# Patient Record
Sex: FEMALE | Race: WHITE | NOT HISPANIC OR LATINO | Employment: OTHER | URBAN - METROPOLITAN AREA
[De-identification: names, ages, dates, MRNs, and addresses within clinical notes are randomized per-mention and may not be internally consistent; named-entity substitution may affect disease eponyms.]

---

## 2023-03-24 PROBLEM — R00.0 TACHYCARDIA: Status: ACTIVE | Noted: 2023-03-24

## 2023-03-24 PROBLEM — R79.89 ABNORMAL TSH: Status: ACTIVE | Noted: 2023-03-24

## 2023-03-24 PROBLEM — E78.5 HYPERLIPIDEMIA: Status: ACTIVE | Noted: 2023-03-24

## 2023-03-24 PROBLEM — G62.9 NEUROPATHY: Status: ACTIVE | Noted: 2023-03-24

## 2023-03-24 PROBLEM — M77.8 TENDINITIS OF FINGER OF RIGHT HAND: Status: ACTIVE | Noted: 2023-03-24

## 2023-03-24 PROBLEM — I48.0 PAROXYSMAL ATRIAL FIBRILLATION (MULTI): Status: ACTIVE | Noted: 2023-03-24

## 2023-03-24 PROBLEM — I48.91 ATRIAL FIBRILLATION (MULTI): Status: ACTIVE | Noted: 2023-03-24

## 2023-03-24 PROBLEM — R73.9 BORDERLINE HYPERGLYCEMIA: Status: ACTIVE | Noted: 2023-03-24

## 2023-03-24 PROBLEM — I25.10 CORONARY ARTERY CALCIFICATION: Status: ACTIVE | Noted: 2023-03-24

## 2023-03-24 PROBLEM — R93.89 ABNORMAL CXR (CHEST X-RAY): Status: ACTIVE | Noted: 2023-03-24

## 2023-03-24 PROBLEM — E66.3 OVERWEIGHT (BMI 25.0-29.9): Status: ACTIVE | Noted: 2023-03-24

## 2023-03-24 PROBLEM — B96.89 ACUTE BACTERIAL SINUSITIS: Status: ACTIVE | Noted: 2023-03-24

## 2023-03-24 PROBLEM — J01.90 ACUTE BACTERIAL SINUSITIS: Status: ACTIVE | Noted: 2023-03-24

## 2023-03-24 PROBLEM — R91.1 LUNG NODULE: Status: ACTIVE | Noted: 2023-03-24

## 2023-03-24 PROBLEM — G47.33 OBSTRUCTIVE SLEEP APNEA: Status: ACTIVE | Noted: 2023-03-24

## 2023-03-24 PROBLEM — I10 HYPERTENSION, BENIGN: Status: ACTIVE | Noted: 2023-03-24

## 2023-03-24 PROBLEM — G43.909 MIGRAINE: Status: ACTIVE | Noted: 2023-03-24

## 2023-03-24 PROBLEM — E78.00 PURE HYPERCHOLESTEROLEMIA: Status: ACTIVE | Noted: 2023-03-24

## 2023-03-24 PROBLEM — K64.9 HEMORRHOIDS: Status: ACTIVE | Noted: 2023-03-24

## 2023-03-24 PROBLEM — I25.84 CORONARY ARTERY CALCIFICATION: Status: ACTIVE | Noted: 2023-03-24

## 2023-03-24 RX ORDER — METOPROLOL SUCCINATE 50 MG/1
1 TABLET, EXTENDED RELEASE ORAL DAILY
COMMUNITY
Start: 2014-12-10 | End: 2023-04-10 | Stop reason: SDUPTHER

## 2023-03-24 RX ORDER — ZINC SULFATE 50(220)MG
1 CAPSULE ORAL DAILY
COMMUNITY

## 2023-03-24 RX ORDER — WARFARIN 4 MG/1
4 TABLET ORAL 2 TIMES WEEKLY
COMMUNITY
Start: 2016-06-27

## 2023-03-24 RX ORDER — POTASSIUM CHLORIDE 20 MEQ/1
1 TABLET, EXTENDED RELEASE ORAL EVERY 12 HOURS
COMMUNITY
Start: 2016-02-14 | End: 2024-03-05 | Stop reason: SDUPTHER

## 2023-03-24 RX ORDER — TALC
250 POWDER (GRAM) TOPICAL 2 TIMES DAILY
COMMUNITY
End: 2023-11-09 | Stop reason: ENTERED-IN-ERROR

## 2023-03-24 RX ORDER — PNV NO.95/FERROUS FUM/FOLIC AC 28MG-0.8MG
1 TABLET ORAL DAILY
COMMUNITY

## 2023-03-24 RX ORDER — SIMVASTATIN 10 MG/1
1 TABLET, FILM COATED ORAL DAILY
COMMUNITY
Start: 2021-04-17 | End: 2023-04-10 | Stop reason: SDUPTHER

## 2023-03-24 RX ORDER — LISINOPRIL AND HYDROCHLOROTHIAZIDE 12.5; 2 MG/1; MG/1
1 TABLET ORAL DAILY
COMMUNITY
Start: 2015-07-28 | End: 2023-10-30

## 2023-03-24 RX ORDER — FLECAINIDE ACETATE 100 MG/1
100 TABLET ORAL 2 TIMES DAILY
COMMUNITY
Start: 2016-02-14 | End: 2023-11-30 | Stop reason: WASHOUT

## 2023-03-24 RX ORDER — IBUPROFEN 100 MG/5ML
1 SUSPENSION, ORAL (FINAL DOSE FORM) ORAL DAILY
COMMUNITY
End: 2023-11-09 | Stop reason: ENTERED-IN-ERROR

## 2023-03-24 RX ORDER — MULTIVITAMIN
1 TABLET ORAL DAILY
COMMUNITY

## 2023-03-24 RX ORDER — ACETAMINOPHEN 500 MG
1 TABLET ORAL DAILY
COMMUNITY
End: 2023-11-09 | Stop reason: ENTERED-IN-ERROR

## 2023-03-24 RX ORDER — LISINOPRIL 20 MG/1
20 TABLET ORAL DAILY
COMMUNITY
Start: 2019-04-16 | End: 2023-12-12 | Stop reason: SDUPTHER

## 2023-03-24 RX ORDER — GABAPENTIN 300 MG/1
300 CAPSULE ORAL 2 TIMES DAILY
COMMUNITY
Start: 2014-11-07 | End: 2023-07-05 | Stop reason: SDUPTHER

## 2023-04-04 DIAGNOSIS — E78.00 PURE HYPERCHOLESTEROLEMIA: ICD-10-CM

## 2023-04-04 DIAGNOSIS — I10 HYPERTENSION, BENIGN: ICD-10-CM

## 2023-04-04 RX ORDER — CLINDAMYCIN HYDROCHLORIDE 300 MG/1
CAPSULE ORAL
COMMUNITY
Start: 2023-02-23 | End: 2023-11-09 | Stop reason: ENTERED-IN-ERROR

## 2023-04-10 ENCOUNTER — TELEPHONE (OUTPATIENT)
Dept: PRIMARY CARE | Facility: CLINIC | Age: 68
End: 2023-04-10
Payer: MEDICARE

## 2023-04-10 DIAGNOSIS — E78.5 HYPERLIPIDEMIA, UNSPECIFIED HYPERLIPIDEMIA TYPE: ICD-10-CM

## 2023-04-10 DIAGNOSIS — I10 HYPERTENSION, BENIGN: Primary | ICD-10-CM

## 2023-04-10 DIAGNOSIS — I48.11 LONGSTANDING PERSISTENT ATRIAL FIBRILLATION (MULTI): ICD-10-CM

## 2023-04-10 RX ORDER — METOPROLOL SUCCINATE 50 MG/1
50 TABLET, EXTENDED RELEASE ORAL DAILY
Qty: 90 TABLET | Refills: 3 | Status: SHIPPED | OUTPATIENT
Start: 2023-04-10 | End: 2023-11-03 | Stop reason: SDUPTHER

## 2023-04-10 RX ORDER — SIMVASTATIN 10 MG/1
10 TABLET, FILM COATED ORAL DAILY
Qty: 90 TABLET | Refills: 3 | Status: SHIPPED | OUTPATIENT
Start: 2023-04-10 | End: 2023-11-28 | Stop reason: WASHOUT

## 2023-04-10 NOTE — TELEPHONE ENCOUNTER
Pt called and states that she has been trying to get her simvastatin and metoprolol refilled and they are pending orders and pt states that she needs a refill. Pharmacy is on file

## 2023-04-17 RX ORDER — METOPROLOL SUCCINATE 50 MG/1
TABLET, EXTENDED RELEASE ORAL
Qty: 90 TABLET | Refills: 0 | Status: SHIPPED | OUTPATIENT
Start: 2023-04-17 | End: 2023-11-03 | Stop reason: SDUPTHER

## 2023-04-17 RX ORDER — SIMVASTATIN 10 MG/1
TABLET, FILM COATED ORAL
Qty: 90 TABLET | Refills: 0 | Status: SHIPPED | OUTPATIENT
Start: 2023-04-17 | End: 2024-01-02

## 2023-07-05 DIAGNOSIS — G62.9 NEUROPATHY: Primary | ICD-10-CM

## 2023-07-05 RX ORDER — GABAPENTIN 300 MG/1
300 CAPSULE ORAL 2 TIMES DAILY
Qty: 180 CAPSULE | Refills: 1 | Status: SHIPPED | OUTPATIENT
Start: 2023-07-05 | End: 2023-12-26 | Stop reason: SDUPTHER

## 2023-10-12 ENCOUNTER — ANTICOAGULATION - WARFARIN VISIT (OUTPATIENT)
Dept: PHARMACY | Facility: CLINIC | Age: 68
End: 2023-10-12
Payer: MEDICARE

## 2023-10-12 DIAGNOSIS — I48.91 ATRIAL FIBRILLATION, UNSPECIFIED TYPE (MULTI): Primary | ICD-10-CM

## 2023-10-12 LAB
POC INR: 3.1
POC PROTHROMBIN TIME: NORMAL

## 2023-10-12 PROCEDURE — 99212 OFFICE O/P EST SF 10 MIN: CPT | Performed by: PHARMACIST

## 2023-10-12 PROCEDURE — 85610 PROTHROMBIN TIME: CPT | Performed by: PHARMACIST

## 2023-10-12 RX ORDER — DIFLUPREDNATE OPHTHALMIC 0.5 MG/ML
EMULSION OPHTHALMIC
COMMUNITY
Start: 2023-07-19 | End: 2023-11-09 | Stop reason: ENTERED-IN-ERROR

## 2023-10-12 RX ORDER — BROMFENAC SODIUM 0.7 MG/ML
SOLUTION/ DROPS OPHTHALMIC
COMMUNITY
Start: 2023-07-19 | End: 2023-11-09 | Stop reason: ENTERED-IN-ERROR

## 2023-10-12 RX ORDER — L.ACID,FERM,PLA,RHA/B.BIF,LONG 126 MG
1 TABLET, DELAYED AND EXTENDED RELEASE ORAL DAILY
COMMUNITY
Start: 2022-12-13 | End: 2023-11-09 | Stop reason: ENTERED-IN-ERROR

## 2023-10-12 RX ORDER — OFLOXACIN 3 MG/ML
SOLUTION/ DROPS OPHTHALMIC
COMMUNITY
Start: 2023-07-19 | End: 2023-11-09 | Stop reason: ENTERED-IN-ERROR

## 2023-10-12 RX ORDER — ASCORBIC ACID 500 MG
1 TABLET ORAL DAILY
COMMUNITY

## 2023-10-12 RX ORDER — ACETAMINOPHEN 500 MG
5 TABLET ORAL NIGHTLY
COMMUNITY
Start: 2022-12-13

## 2023-10-12 NOTE — PROGRESS NOTES
Coumadin Clinic Visit Note    Patient verified warfarin dose  No missed doses  No unusual bruising or bleeding  No changes to medications  Consistent dietary green intake  No anticipated procedures at this time  INR Supratherapeutic today at 3.1  No changes to warfarin dose today  Next appointment 4 weeks      Yamilex Vickers, PharmD

## 2023-10-28 DIAGNOSIS — I10 HYPERTENSION, BENIGN: ICD-10-CM

## 2023-10-28 DIAGNOSIS — R73.9 BORDERLINE HYPERGLYCEMIA: Primary | ICD-10-CM

## 2023-10-30 RX ORDER — ATENOLOL AND CHLORTHALIDONE TABLET 50; 25 MG/1; MG/1
1 TABLET ORAL DAILY
COMMUNITY
Start: 2004-10-28 | End: 2023-11-09 | Stop reason: ENTERED-IN-ERROR

## 2023-10-30 RX ORDER — OXYCODONE AND ACETAMINOPHEN 5; 325 MG/1; MG/1
TABLET ORAL
COMMUNITY
Start: 2006-12-22 | End: 2023-11-09 | Stop reason: ENTERED-IN-ERROR

## 2023-10-30 RX ORDER — MAGNESIUM 250 MG
TABLET ORAL
COMMUNITY
End: 2023-11-09 | Stop reason: ENTERED-IN-ERROR

## 2023-10-30 RX ORDER — LISINOPRIL AND HYDROCHLOROTHIAZIDE 12.5; 2 MG/1; MG/1
1 TABLET ORAL DAILY
Qty: 90 TABLET | Refills: 0 | Status: SHIPPED | OUTPATIENT
Start: 2023-10-30 | End: 2024-02-02 | Stop reason: SDUPTHER

## 2023-11-03 DIAGNOSIS — I10 HYPERTENSION, BENIGN: ICD-10-CM

## 2023-11-05 RX ORDER — METOPROLOL SUCCINATE 50 MG/1
50 TABLET, EXTENDED RELEASE ORAL DAILY
Qty: 90 TABLET | Refills: 0 | Status: SHIPPED | OUTPATIENT
Start: 2023-11-05 | End: 2024-04-02 | Stop reason: SDUPTHER

## 2023-11-05 RX ORDER — METOPROLOL SUCCINATE 50 MG/1
50 TABLET, EXTENDED RELEASE ORAL DAILY
Qty: 90 TABLET | Refills: 3 | Status: SHIPPED | OUTPATIENT
Start: 2023-11-05 | End: 2023-11-28 | Stop reason: WASHOUT

## 2023-11-09 ENCOUNTER — HOSPITAL ENCOUNTER (OUTPATIENT)
Dept: CARDIOLOGY | Facility: HOSPITAL | Age: 68
Discharge: HOME | End: 2023-11-09
Payer: MEDICARE

## 2023-11-09 ENCOUNTER — ANTICOAGULATION - WARFARIN VISIT (OUTPATIENT)
Dept: PHARMACY | Facility: CLINIC | Age: 68
End: 2023-11-09
Payer: MEDICARE

## 2023-11-09 ENCOUNTER — TELEPHONE (OUTPATIENT)
Dept: CARDIOLOGY | Facility: CLINIC | Age: 68
End: 2023-11-09
Payer: MEDICARE

## 2023-11-09 ENCOUNTER — APPOINTMENT (OUTPATIENT)
Dept: RADIOLOGY | Facility: HOSPITAL | Age: 68
End: 2023-11-09
Payer: MEDICARE

## 2023-11-09 ENCOUNTER — HOSPITAL ENCOUNTER (EMERGENCY)
Facility: HOSPITAL | Age: 68
Discharge: HOME | End: 2023-11-09
Attending: STUDENT IN AN ORGANIZED HEALTH CARE EDUCATION/TRAINING PROGRAM
Payer: MEDICARE

## 2023-11-09 VITALS
RESPIRATION RATE: 16 BRPM | BODY MASS INDEX: 28.61 KG/M2 | HEART RATE: 79 BPM | WEIGHT: 178 LBS | TEMPERATURE: 97 F | OXYGEN SATURATION: 97 % | DIASTOLIC BLOOD PRESSURE: 73 MMHG | HEIGHT: 66 IN | SYSTOLIC BLOOD PRESSURE: 144 MMHG

## 2023-11-09 DIAGNOSIS — I48.0 PAROXYSMAL ATRIAL FIBRILLATION (MULTI): Primary | ICD-10-CM

## 2023-11-09 DIAGNOSIS — I24.9 ACS (ACUTE CORONARY SYNDROME) (MULTI): ICD-10-CM

## 2023-11-09 DIAGNOSIS — I48.91 ATRIAL FIBRILLATION, UNSPECIFIED TYPE (MULTI): Primary | ICD-10-CM

## 2023-11-09 LAB
ALBUMIN SERPL BCP-MCNC: 4.7 G/DL (ref 3.4–5)
ALP SERPL-CCNC: 90 U/L (ref 33–136)
ALT SERPL W P-5'-P-CCNC: 40 U/L (ref 7–45)
ANION GAP SERPL CALC-SCNC: 13 MMOL/L (ref 10–20)
AST SERPL W P-5'-P-CCNC: 37 U/L (ref 9–39)
BASOPHILS # BLD MANUAL: 0.15 X10*3/UL (ref 0–0.1)
BASOPHILS NFR BLD MANUAL: 1 %
BILIRUB SERPL-MCNC: 0.5 MG/DL (ref 0–1.2)
BUN SERPL-MCNC: 15 MG/DL (ref 6–23)
CALCIUM SERPL-MCNC: 9.9 MG/DL (ref 8.6–10.3)
CARDIAC TROPONIN I PNL SERPL HS: 4 NG/L (ref 0–13)
CARDIAC TROPONIN I PNL SERPL HS: 4 NG/L (ref 0–13)
CHLORIDE SERPL-SCNC: 103 MMOL/L (ref 98–107)
CO2 SERPL-SCNC: 28 MMOL/L (ref 21–32)
CREAT SERPL-MCNC: 0.78 MG/DL (ref 0.5–1.05)
EOSINOPHIL # BLD MANUAL: 0.58 X10*3/UL (ref 0–0.7)
EOSINOPHIL NFR BLD MANUAL: 4 %
ERYTHROCYTE [DISTWIDTH] IN BLOOD BY AUTOMATED COUNT: 14.2 % (ref 11.5–14.5)
GFR SERPL CREATININE-BSD FRML MDRD: 82 ML/MIN/1.73M*2
GLUCOSE SERPL-MCNC: 113 MG/DL (ref 74–99)
HCT VFR BLD AUTO: 51.1 % (ref 36–46)
HGB BLD-MCNC: 17 G/DL (ref 12–16)
IMM GRANULOCYTES # BLD AUTO: 0.09 X10*3/UL (ref 0–0.7)
IMM GRANULOCYTES NFR BLD AUTO: 0.6 % (ref 0–0.9)
LYMPHOCYTES # BLD MANUAL: 3.36 X10*3/UL (ref 1.2–4.8)
LYMPHOCYTES NFR BLD MANUAL: 23 %
MAGNESIUM SERPL-MCNC: 1.69 MG/DL (ref 1.6–2.4)
MCH RBC QN AUTO: 30.1 PG (ref 26–34)
MCHC RBC AUTO-ENTMCNC: 33.3 G/DL (ref 32–36)
MCV RBC AUTO: 91 FL (ref 80–100)
MONOCYTES # BLD MANUAL: 1.02 X10*3/UL (ref 0.1–1)
MONOCYTES NFR BLD MANUAL: 7 %
NEUTS SEG # BLD MANUAL: 9.49 X10*3/UL (ref 1.2–7)
NEUTS SEG NFR BLD MANUAL: 65 %
NRBC BLD-RTO: 0 /100 WBCS (ref 0–0)
PLATELET # BLD AUTO: 342 X10*3/UL (ref 150–450)
POC INR: 2.4
POC PROTHROMBIN TIME: NORMAL
POTASSIUM SERPL-SCNC: 4.4 MMOL/L (ref 3.5–5.3)
PROT SERPL-MCNC: 7.7 G/DL (ref 6.4–8.2)
RBC # BLD AUTO: 5.64 X10*6/UL (ref 4–5.2)
RBC MORPH BLD: ABNORMAL
SODIUM SERPL-SCNC: 140 MMOL/L (ref 136–145)
TOTAL CELLS COUNTED BLD: 100
WBC # BLD AUTO: 14.6 X10*3/UL (ref 4.4–11.3)

## 2023-11-09 PROCEDURE — 99283 EMERGENCY DEPT VISIT LOW MDM: CPT | Mod: 25

## 2023-11-09 PROCEDURE — 71045 X-RAY EXAM CHEST 1 VIEW: CPT | Performed by: RADIOLOGY

## 2023-11-09 PROCEDURE — 99285 EMERGENCY DEPT VISIT HI MDM: CPT | Mod: 25 | Performed by: STUDENT IN AN ORGANIZED HEALTH CARE EDUCATION/TRAINING PROGRAM

## 2023-11-09 PROCEDURE — 93005 ELECTROCARDIOGRAM TRACING: CPT

## 2023-11-09 PROCEDURE — 84484 ASSAY OF TROPONIN QUANT: CPT | Performed by: PHYSICIAN ASSISTANT

## 2023-11-09 PROCEDURE — 85007 BL SMEAR W/DIFF WBC COUNT: CPT | Performed by: PHYSICIAN ASSISTANT

## 2023-11-09 PROCEDURE — 84484 ASSAY OF TROPONIN QUANT: CPT | Performed by: STUDENT IN AN ORGANIZED HEALTH CARE EDUCATION/TRAINING PROGRAM

## 2023-11-09 PROCEDURE — 84075 ASSAY ALKALINE PHOSPHATASE: CPT | Performed by: PHYSICIAN ASSISTANT

## 2023-11-09 PROCEDURE — 99284 EMERGENCY DEPT VISIT MOD MDM: CPT | Mod: 25

## 2023-11-09 PROCEDURE — 71045 X-RAY EXAM CHEST 1 VIEW: CPT

## 2023-11-09 PROCEDURE — 85610 PROTHROMBIN TIME: CPT | Mod: QW | Performed by: PHARMACIST

## 2023-11-09 PROCEDURE — 99212 OFFICE O/P EST SF 10 MIN: CPT | Mod: 25 | Performed by: PHARMACIST

## 2023-11-09 PROCEDURE — 85027 COMPLETE CBC AUTOMATED: CPT | Performed by: PHYSICIAN ASSISTANT

## 2023-11-09 PROCEDURE — 36415 COLL VENOUS BLD VENIPUNCTURE: CPT | Performed by: PHYSICIAN ASSISTANT

## 2023-11-09 PROCEDURE — 83735 ASSAY OF MAGNESIUM: CPT | Performed by: PHYSICIAN ASSISTANT

## 2023-11-09 RX ORDER — WARFARIN 4 MG/1
2 TABLET ORAL
COMMUNITY

## 2023-11-09 RX ORDER — CEPHRADINE 500 MG
1 CAPSULE ORAL DAILY
COMMUNITY

## 2023-11-09 RX ORDER — FLECAINIDE ACETATE 50 MG/1
50 TABLET ORAL 2 TIMES DAILY
Qty: 60 TABLET | Refills: 0 | Status: SHIPPED | OUTPATIENT
Start: 2023-11-09 | End: 2023-11-30 | Stop reason: WASHOUT

## 2023-11-09 RX ORDER — BACLOFEN 20 MG
1 TABLET ORAL DAILY
COMMUNITY

## 2023-11-09 ASSESSMENT — PAIN DESCRIPTION - PROGRESSION: CLINICAL_PROGRESSION: NOT CHANGED

## 2023-11-09 ASSESSMENT — LIFESTYLE VARIABLES
REASON UNABLE TO ASSESS: NO
EVER FELT BAD OR GUILTY ABOUT YOUR DRINKING: NO
HAVE PEOPLE ANNOYED YOU BY CRITICIZING YOUR DRINKING: NO
EVER HAD A DRINK FIRST THING IN THE MORNING TO STEADY YOUR NERVES TO GET RID OF A HANGOVER: NO
HAVE YOU EVER FELT YOU SHOULD CUT DOWN ON YOUR DRINKING: NO

## 2023-11-09 ASSESSMENT — COLUMBIA-SUICIDE SEVERITY RATING SCALE - C-SSRS
1. IN THE PAST MONTH, HAVE YOU WISHED YOU WERE DEAD OR WISHED YOU COULD GO TO SLEEP AND NOT WAKE UP?: NO
6. HAVE YOU EVER DONE ANYTHING, STARTED TO DO ANYTHING, OR PREPARED TO DO ANYTHING TO END YOUR LIFE?: NO
2. HAVE YOU ACTUALLY HAD ANY THOUGHTS OF KILLING YOURSELF?: NO

## 2023-11-09 ASSESSMENT — PAIN SCALES - GENERAL
PAINLEVEL_OUTOF10: 0 - NO PAIN

## 2023-11-09 ASSESSMENT — PAIN - FUNCTIONAL ASSESSMENT: PAIN_FUNCTIONAL_ASSESSMENT: 0-10

## 2023-11-09 NOTE — TELEPHONE ENCOUNTER
Patient's  called stating patient has been in Afib since Tuesday. Patient's heart rate sustaining 110-120, /93.  Patient SOB and fatigued. Spoke to patient on phone, who was SOB while speaking to her. Instructed patient to seek ER treatment. Patient verbalized understanding.

## 2023-11-09 NOTE — PROGRESS NOTES
Verified current dose with pt.    No new meds or med changes since last visit.  Pt denies unusual bleed/bruise.  No upcoming procedures.  Inr = 2.4  Will put pt back on old dose of 18 mg/week.    Pt is currently in afib - she was told to go to ED right now.  She is concerned about being admitted; also about inr getting too low - so put back on her old dose.  check again in 4 weeks.

## 2023-11-09 NOTE — PROGRESS NOTES
Pharmacy Medication History Review    Savita Buchanan is a 68 y.o. female admitted for No Principal Problem: There is no principal problem currently on the Problem List. Please update the Problem List and refresh.. Pharmacy reviewed the patient's [id number] medications and allergies for accuracy.    The list below reflectives the updated PTA list. Please review each medication in order reconciliation for additional clarification and justification.  (Not in a hospital admission)       The list below reflectives the updated allergy list. Please review each documented allergy for additional clarification and justification.  Allergies  Reviewed by Fe Ray CPhT on 11/9/2023        Severity Reactions Comments    Penicillins High Hives, Shortness of breath     Latex Medium Hives     Phenobarbital Medium Hives     Sulfa (sulfonamide Antibiotics) Medium Hives     Neomycin-bacitracin-polymyxin Low Rash             Below are additional concerns with the patient's PTA list.    See PTA med list    Fe Ray CPhT

## 2023-11-09 NOTE — ED PROVIDER NOTES
Limitations to History: none  External Records Reviewed  Independent Historians: none  Social determinants affecting care: none    HPI  Savita Buchanan is a 68 y.o. female with history of paroxysmal atrial fibrillation on Coumadin, hypertension, hyperlipidemia, who presents emergency department with her  for assessment of palpitations for the last 3 days.  She reports that she started feeling her heart race and felt short of breath.  She feels a slight tightness in her throat.  She reports that she knows that she is in A-fib.  She is on flecainide has been compliant.  She did take another dose of this but that did not help.  She reports that she called her cardiology's office however the nurse on staff recommended she go to the ER.  She denies any chest pains.  She denies recent illness.  She has not had fever or chills.  She has been compliant with her medication.  She has no further complaints.    [facility]  Past Medical History:   Diagnosis Date    A-fib (CMS/[facility])     Personal history of Methicillin resistant Staphylococcus aureus infection     History of methicillin resistant Staphylococcus aureus infection    Personal history of other diseases of the musculoskeletal system and connective tissue     History of backache    Personal history of other drug therapy 10/12/2016    History of influenza vaccination    Personal history of urinary (tract) infections     History of urinary tract infection    reviewed by myself.    Meds  Current Outpatient Medications   Medication Instructions    ascorbic acid (Vitamin C) 500 mg tablet 1 tablet, oral, Daily    cholecalciferol, vitamin D3, 250 mcg (10,000 unit) capsule 1 capsule, oral, Daily    cyanocobalamin (Vitamin B-12) 100 mcg tablet 1 tablet, oral, Daily    flecainide (TAMBOCOR) 100 mg, oral, 2 times daily    flecainide (TAMBOCOR) 50 mg, oral, 2 times daily    gabapentin (NEURONTIN) 300 mg, oral, 2 times daily    Lactobacillus acidophilus (PROBIOTIC ORAL) 1 tablet,  "oral, Daily    lisinopriL-hydrochlorothiazide 20-12.5 mg tablet 1 tablet, oral, Daily    lisinopril 20 mg, oral, Daily    magnesium oxide 500 mg tablet 1 tablet, oral, Daily    melatonin 5 mg, oral, Nightly    metoprolol succinate XL (TOPROL-XL) 50 mg, oral, Daily, Do not crush or chew.    metoprolol succinate XL (TOPROL-XL) 50 mg, oral, Daily    multivitamin tablet 1 tablet, oral, Daily    potassium chloride CR (Klor-Con M20) 20 mEq ER tablet 1 tablet, oral, Every 12 hours    simvastatin (Zocor) 10 mg tablet TAKE ONE TABLET BY MOUTH EVERY DAY    simvastatin (ZOCOR) 10 mg, oral, Daily    warfarin (COUMADIN) 2 mg, oral, 5 times weekly, On Tuesday, Wednesday, Thursday, Saturday, and Sunday    warfarin (JANTOVEN) 4 mg, oral, 2 times weekly, On Monday and Friday    zinc sulfate (Zincate) 220 (50 Zn) MG capsule 1 capsule, oral, Daily       Allergies  Allergies   Allergen Reactions    Penicillins Hives and Shortness of breath    Latex Hives    Phenobarbital Hives    Sulfa (Sulfonamide Antibiotics) Hives    Neomycin-Bacitracin-Polymyxin Rash    reviewed by myself.    SHx    reviewed by myself.      ------------------------------------------------------------------------------------------------------------------------------------------    /73 (BP Location: Right arm, Patient Position: Lying)   Pulse 79   Temp 36.1 °C (97 °F) (Temporal)   Resp 16   Ht 1.676 m (5' 6\")   Wt 80.7 kg (178 lb)   SpO2 97%   BMI 28.73 kg/m²     Physical Exam  Vitals and nursing note reviewed.   Constitutional:       General: She is not in acute distress.     Appearance: Normal appearance. She is obese. She is not toxic-appearing.   HENT:      Head: Normocephalic.      Nose: Nose normal.      Mouth/Throat:      Mouth: Mucous membranes are moist.   Eyes:      Extraocular Movements: Extraocular movements intact.      Conjunctiva/sclera: Conjunctivae normal.   Cardiovascular:      Rate and Rhythm: Tachycardia present. Rhythm irregular.     "  Pulses: Normal pulses.   Pulmonary:      Effort: Pulmonary effort is normal.      Breath sounds: Normal breath sounds.   Musculoskeletal:         General: Normal range of motion.      Cervical back: Neck supple.      Right lower leg: No edema.      Left lower leg: No edema.   Skin:     General: Skin is warm and dry.   Neurological:      General: No focal deficit present.      Mental Status: She is alert and oriented to person, place, and time.   Psychiatric:         Attention and Perception: Attention normal.         Mood and Affect: Mood normal.          ------------------------------------------------------------------------------------------------------------------------------------------  Imaging  XR chest 1 view   Final Result   No focal infiltrate or pneumothorax is identified.        MACRO:   None.        Signed by: Alex Ramirez 11/9/2023 12:34 PM   Dictation workstation:   [id number]           Labs  Labs Reviewed   CBC WITH AUTO DIFFERENTIAL - Abnormal       Result Value    WBC 14.6 (*)     nRBC 0.0      RBC 5.64 (*)     Hemoglobin 17.0 (*)     Hematocrit 51.1 (*)     MCV 91      MCH 30.1      MCHC 33.3      RDW 14.2      Platelets 342      Immature Granulocytes %, Automated 0.6      Immature Granulocytes Absolute, Automated 0.09      Narrative:     The previously reported component Neutrophils % is no longer being reported.  The previously reported component Lymphocytes % is no longer being reported.  The previously reported component Monocytes % is no longer being reported.  The previously   reported component Eosinophils % is no longer being reported.  The previously reported component Basophils % is no longer being reported.  The previously reported component Absolute Neutrophils is no longer being reported.  The previously reported   component Absolute Lymphocytes is no longer being reported.  The previously reported component Absolute Monocytes is no longer being reported.  The previously reported  component Absolute Eosinophils is no longer being reported.  The previously reported   component Absolute Basophils is no longer being reported.   COMPREHENSIVE METABOLIC PANEL - Abnormal    Glucose 113 (*)     Sodium 140      Potassium 4.4      Chloride 103      Bicarbonate 28      Anion Gap 13      Urea Nitrogen 15      Creatinine 0.78      eGFR 82      Calcium 9.9      Albumin 4.7      Alkaline Phosphatase 90      Total Protein 7.7      AST 37      Bilirubin, Total 0.5      ALT 40     MANUAL DIFFERENTIAL - Abnormal    Neutrophils %, Manual 65.0      Lymphocytes %, Manual 23.0      Monocytes %, Manual 7.0      Eosinophils %, Manual 4.0      Basophils %, Manual 1.0      Seg Neutrophils Absolute, Manual 9.49 (*)     Lymphocytes Absolute, Manual 3.36      Monocytes Absolute, Manual 1.02 (*)     Eosinophils Absolute, Manual 0.58      Basophils Absolute, Manual 0.15 (*)     Total Cells Counted 100      RBC Morphology No significant RBC morphology present     MAGNESIUM - Normal    Magnesium 1.69     SERIAL TROPONIN-INITIAL - Normal    Troponin I, High Sensitivity 4      Narrative:     Less than 99th percentile of normal range cutoff-  Female and children under 18 years old <14 ng/L; Male <21 ng/L: Negative  Repeat testing should be performed if clinically indicated.     Female and children under 18 years old 14-50 ng/L; Male 21-50 ng/L:  Consistent with possible cardiac damage and possible increased clinical   risk. Serial measurements may help to assess extent of myocardial damage.     >50 ng/L: Consistent with cardiac damage, increased clinical risk and  myocardial infarction. Serial measurements may help assess extent of   myocardial damage.      NOTE: Children less than 1 year old may have higher baseline troponin   levels and results should be interpreted in conjunction with the overall   clinical context.     NOTE: Troponin I testing is performed using a different   testing methodology at [facility]  than at other   [facility] hospitals. Direct result comparisons should only   be made within the same method.   TROPONIN I, HIGH SENSITIVITY - Normal    Troponin I, High Sensitivity 4      Narrative:     Less than 99th percentile of normal range cutoff-  Female and children under 18 years old <14 ng/L; Male <21 ng/L: Negative  Repeat testing should be performed if clinically indicated.     Female and children under 18 years old 14-50 ng/L; Male 21-50 ng/L:  Consistent with possible cardiac damage and possible increased clinical   risk. Serial measurements may help to assess extent of myocardial damage.     >50 ng/L: Consistent with cardiac damage, increased clinical risk and  myocardial infarction. Serial measurements may help assess extent of   myocardial damage.      NOTE: Children less than 1 year old may have higher baseline troponin   levels and results should be interpreted in conjunction with the overall   clinical context.     NOTE: Troponin I testing is performed using a different   testing methodology at [facility] than at [facility]. Direct result comparisons should only   be made within the same method.   TROPONIN SERIES- (INITIAL, 1 HR)    Narrative:     The following orders were created for panel order Troponin Series, (0, 1 HR).  Procedure                               Abnormality         Status                     ---------                               -----------         ------                     Troponin I, High Sensiti...[[id number]]  Normal              Final result               Troponin, High Sensitivi...[[id number]]                                                   Please view results for these tests on the individual orders.   SERIAL TROPONIN, 1 HOUR        ED Course  Diagnoses as of 11/09/23 1708   Paroxysmal atrial fibrillation (CMS/[facility])        Medical Decision Making: She did not appear ill or toxic.  Vital signs reviewed.  She is tachycardic.  She is otherwise  hemodynamically stable.  She is placed in a continuous cardiac and pulse ox monitor.    Differential diagnoses considered: Electrolyte abnormalities, cardiac arrhythmia, dehydration, infection, others    Medications given: None    EKG interpreted by myself: Atrial flutter.  Ventricular rate 107 bpm.  No acute ST elevations or depressions.    I reviewed the labs from today.  Leukocytosis at 14.6.  H&H below slightly elevated.  Platelets are normal.  Troponin x2 negative.  Glucose 113.  BUN and creatinine normal.  Magnesium normal.  Patient's heart rate has never been above 115.  She has been steadily and 10 5-1 10.  I consulted her cardiology group.  I spoke with Dr. Dyer who recommends increasing her flecainide to 150 mg twice a day.  This was discussed with the patient who is agreeable to plan of care.  She will call her cardiologist tomorrow for close follow-up appointment.  She is to return to the ER immediately if symptoms change or worsen.  She verbalized understanding and agreed to plan of care.  She was discharged home in stable condition.  Case discussed and evaluated with ED attending, Dr. Franco who is agreeable to patient plan of care.    Diagnosis: Atrial fibrillation  Plan: Discharge           Jose Thomas PA-C  11/09/23 [id number]

## 2023-11-09 NOTE — DISCHARGE INSTRUCTIONS
Please follow-up with your cardiologist in the next 2 to 3 days for reassessment of your A-fib.  They would like you to increase your flecainide to 150 mg twice a day to hopefully return to normal sinus rhythm.  Return to the ER immediately if your symptoms change or worsen.

## 2023-11-09 NOTE — ED TRIAGE NOTES
Patient arrives from home with complaints of rapid heart rate and Afib.  She state she has a history of afib but normally doesn't stay in it for too long.   She awoke this morning and felt her heart racing.

## 2023-11-21 PROBLEM — I48.91 ATRIAL FIBRILLATION (MULTI): Status: RESOLVED | Noted: 2023-03-24 | Resolved: 2023-11-21

## 2023-11-21 PROBLEM — E78.00 PURE HYPERCHOLESTEROLEMIA: Status: RESOLVED | Noted: 2023-03-24 | Resolved: 2023-11-21

## 2023-11-28 ENCOUNTER — OFFICE VISIT (OUTPATIENT)
Dept: CARDIOLOGY | Facility: CLINIC | Age: 68
End: 2023-11-28
Payer: MEDICARE

## 2023-11-28 VITALS
SYSTOLIC BLOOD PRESSURE: 132 MMHG | DIASTOLIC BLOOD PRESSURE: 76 MMHG | WEIGHT: 190 LBS | OXYGEN SATURATION: 95 % | HEART RATE: 68 BPM | RESPIRATION RATE: 16 BRPM | BODY MASS INDEX: 30.67 KG/M2

## 2023-11-28 DIAGNOSIS — R73.9 BORDERLINE HYPERGLYCEMIA: ICD-10-CM

## 2023-11-28 DIAGNOSIS — I10 HYPERTENSION, BENIGN: ICD-10-CM

## 2023-11-28 DIAGNOSIS — I25.10 CORONARY ARTERY CALCIFICATION: Primary | ICD-10-CM

## 2023-11-28 DIAGNOSIS — E78.00 PURE HYPERCHOLESTEROLEMIA: ICD-10-CM

## 2023-11-28 DIAGNOSIS — I48.0 PAROXYSMAL ATRIAL FIBRILLATION (MULTI): ICD-10-CM

## 2023-11-28 DIAGNOSIS — I25.84 CORONARY ARTERY CALCIFICATION: Primary | ICD-10-CM

## 2023-11-28 DIAGNOSIS — G47.33 OBSTRUCTIVE SLEEP APNEA: ICD-10-CM

## 2023-11-28 DIAGNOSIS — I48.0 PAROXYSMAL ATRIAL FIBRILLATION (MULTI): Primary | ICD-10-CM

## 2023-11-28 PROCEDURE — 99214 OFFICE O/P EST MOD 30 MIN: CPT | Performed by: INTERNAL MEDICINE

## 2023-11-28 PROCEDURE — 3074F SYST BP LT 130 MM HG: CPT | Performed by: INTERNAL MEDICINE

## 2023-11-28 PROCEDURE — 1159F MED LIST DOCD IN RCRD: CPT | Performed by: INTERNAL MEDICINE

## 2023-11-28 PROCEDURE — 1126F AMNT PAIN NOTED NONE PRSNT: CPT | Performed by: INTERNAL MEDICINE

## 2023-11-28 PROCEDURE — 1160F RVW MEDS BY RX/DR IN RCRD: CPT | Performed by: INTERNAL MEDICINE

## 2023-11-28 PROCEDURE — 3078F DIAST BP <80 MM HG: CPT | Performed by: INTERNAL MEDICINE

## 2023-11-28 PROCEDURE — 1036F TOBACCO NON-USER: CPT | Performed by: INTERNAL MEDICINE

## 2023-11-28 RX ORDER — FLECAINIDE ACETATE 100 MG/1
150 TABLET ORAL 2 TIMES DAILY
Qty: 270 TABLET | Refills: 3 | Status: SHIPPED | OUTPATIENT
Start: 2023-11-28 | End: 2024-11-27

## 2023-11-28 RX ORDER — FLECAINIDE ACETATE 50 MG/1
50 TABLET ORAL
COMMUNITY
End: 2023-11-30 | Stop reason: WASHOUT

## 2023-11-28 NOTE — PROGRESS NOTES
Chief Complaint:   Hospital Follow-up and Atrial Fibrillation     History Of Present Illness:    Savita Buchanan is a 68 y.o. female presenting with PAFIB.  To ED 11/9/23 with AFIB-flecainide increased and patient sent home  Also AFIB on Thanksgiving  NSR since  No bleeding with warfarin    Has dyspnea when in AFIB  No CP/dizziness/LH/edema    No regular exercise    Wearing CPAP         Last Recorded Vitals:  Vitals:    11/28/23 1442 11/28/23 1505   BP: 118/66 132/76   BP Location: Left arm    Patient Position: Sitting    Pulse: 68    Resp: 16    SpO2: 95%    Weight: 86.2 kg (190 lb)             Allergies:  Penicillins, Latex, Phenobarbital, Sulfa (sulfonamide antibiotics), and Neomycin-bacitracin-polymyxin    Outpatient Medications:  Current Outpatient Medications   Medication Instructions    ascorbic acid (Vitamin C) 500 mg tablet 1 tablet, oral, Daily    cholecalciferol, vitamin D3, 250 mcg (10,000 unit) capsule 1 capsule, oral, Daily    cyanocobalamin (Vitamin B-12) 100 mcg tablet 1 tablet, oral, Daily    flecainide (TAMBOCOR) 100 mg, oral, 2 times daily    flecainide (TAMBOCOR) 50 mg, oral, 2 times daily    flecainide (TAMBOCOR) 50 mg, oral    gabapentin (NEURONTIN) 300 mg, oral, 2 times daily    Lactobacillus acidophilus (PROBIOTIC ORAL) 1 tablet, oral, Daily    lisinopriL-hydrochlorothiazide 20-12.5 mg tablet 1 tablet, oral, Daily    lisinopril 20 mg, oral, Daily    magnesium oxide 500 mg tablet 1 tablet, oral, Daily    melatonin 5 mg, oral, Nightly    metoprolol succinate XL (TOPROL-XL) 50 mg, oral, Daily, Do not crush or chew.    multivitamin tablet 1 tablet, oral, Daily    potassium chloride CR (Klor-Con M20) 20 mEq ER tablet 1 tablet, oral, Every 12 hours    simvastatin (Zocor) 10 mg tablet TAKE ONE TABLET BY MOUTH EVERY DAY    warfarin (COUMADIN) 2 mg, oral, 5 times weekly, On Tuesday, Wednesday, Thursday, Saturday, and Sunday    warfarin (JANTOVEN) 4 mg, oral, 2 times weekly, On Monday and Friday     zinc sulfate (Zincate) 220 (50 Zn) MG capsule 1 capsule, oral, Daily       Physical Exam:  Constitutional:       General: Awake.      Appearance: Not in distress. Obese.   Neck:      Vascular: No JVR. JVD normal.   Pulmonary:      Effort: Pulmonary effort is normal.      Breath sounds: Normal breath sounds. No wheezing. No rhonchi. No rales.   Chest:      Chest wall: Not tender to palpatation.   Cardiovascular:      PMI at left midclavicular line. Normal rate. Regular rhythm. Normal S1. Normal S2.       Murmurs: There is no murmur.      No gallop.  No click. No rub.   Pulses:     Intact distal pulses.   Edema:     Peripheral edema absent.   Abdominal:      General: Abdomen is protuberant. Bowel sounds are normal.      Palpations: Abdomen is soft.      Tenderness: There is no abdominal tenderness.   Musculoskeletal: Normal range of motion.         General: No tenderness. Skin:     General: Skin is warm and dry.   Neurological:      General: No focal deficit present.      Mental Status: Alert and oriented to person, place and time.          Last Labs:  CBC -  Lab Results   Component Value Date    WBC 14.6 (H) 11/09/2023    HGB 17.0 (H) 11/09/2023    HCT 51.1 (H) 11/09/2023    MCV 91 11/09/2023     11/09/2023       CMP -  Lab Results   Component Value Date    CALCIUM 9.9 11/09/2023    PROT 7.7 11/09/2023    ALBUMIN 4.7 11/09/2023    AST 37 11/09/2023    ALT 40 11/09/2023    ALKPHOS 90 11/09/2023    BILITOT 0.5 11/09/2023       LIPID PANEL -   Lab Results   Component Value Date    CHOL 149 10/17/2019    TRIG 176 (H) 10/17/2019    HDL 52.2 10/17/2019    CHHDL 2.9 10/17/2019    LDLF 62 10/17/2019    VLDL 35 10/17/2019    NHDL 108 10/05/2018       RENAL FUNCTION PANEL -   Lab Results   Component Value Date    GLUCOSE 113 (H) 11/09/2023     11/09/2023    K 4.4 11/09/2023     11/09/2023    CO2 28 11/09/2023    ANIONGAP 13 11/09/2023    BUN 15 11/09/2023    CREATININE 0.78 11/09/2023    CALCIUM 9.9  11/09/2023    ALBUMIN 4.7 11/09/2023        Lab Results   Component Value Date    HGBA1C 6.2 (A) 11/15/2022           Lab review: I have personally reviewed the laboratory result(s)       Problem List Items Addressed This Visit       Paroxysmal atrial fibrillation (CMS/HCC)    Overview     Converted on flecainide 2/14/2016 and 9/10/2017.  Reported freq EP/of A-fib on 6/11/2020 OV, thus increased flecainide.  Now with recurrent AFIB-11/9 hospital ED presentation and on Thanksgiving   Now on 150 mg BID Flecainide   5/2020 stress echo negative. On beta blockers also. On warfarin. Follows in Coumadin Clinic.  Check echo         Coronary artery calcification - Primary    Overview     Per coronary calcium on CT chest.   Patient with no angina and 5/4/2020 stress echo w/decreased exercise tolerance but no ischemia and NL LVEF. On statin and warfarin.          Hyperlipidemia    Overview     On low-intensity statin but 11/2022 lipids with LDL=58, HDL=48, GO=625. Needs weight loss.   Recheck         Hypertension, benign    Overview     BP well controlled. 11/2023 BMP ok.         Obstructive sleep apnea    Overview     On CPAP            See EP=PAFIB    Weight loss    Deep Nur,

## 2023-11-30 ENCOUNTER — OFFICE VISIT (OUTPATIENT)
Dept: PRIMARY CARE | Facility: CLINIC | Age: 68
End: 2023-11-30
Payer: MEDICARE

## 2023-11-30 VITALS
HEIGHT: 67 IN | SYSTOLIC BLOOD PRESSURE: 124 MMHG | TEMPERATURE: 97.5 F | OXYGEN SATURATION: 99 % | HEART RATE: 74 BPM | DIASTOLIC BLOOD PRESSURE: 80 MMHG | WEIGHT: 187.6 LBS | BODY MASS INDEX: 29.44 KG/M2

## 2023-11-30 DIAGNOSIS — D33.4 BENIGN NEOPLASM OF SPINAL CORD (MULTI): ICD-10-CM

## 2023-11-30 DIAGNOSIS — I10 HYPERTENSION, BENIGN: ICD-10-CM

## 2023-11-30 DIAGNOSIS — I48.0 PAROXYSMAL ATRIAL FIBRILLATION (MULTI): ICD-10-CM

## 2023-11-30 DIAGNOSIS — M51.26 DISPLACEMENT OF LUMBAR INTERVERTEBRAL DISC WITHOUT MYELOPATHY: ICD-10-CM

## 2023-11-30 DIAGNOSIS — E66.3 OVERWEIGHT (BMI 25.0-29.9): ICD-10-CM

## 2023-11-30 DIAGNOSIS — E78.5 HYPERLIPIDEMIA, UNSPECIFIED HYPERLIPIDEMIA TYPE: ICD-10-CM

## 2023-11-30 DIAGNOSIS — Z00.00 MEDICARE ANNUAL WELLNESS VISIT, SUBSEQUENT: Primary | ICD-10-CM

## 2023-11-30 DIAGNOSIS — G62.9 NEUROPATHY: ICD-10-CM

## 2023-11-30 DIAGNOSIS — G47.33 OBSTRUCTIVE SLEEP APNEA: ICD-10-CM

## 2023-11-30 PROCEDURE — 85025 COMPLETE CBC W/AUTO DIFF WBC: CPT

## 2023-11-30 PROCEDURE — 1170F FXNL STATUS ASSESSED: CPT | Performed by: FAMILY MEDICINE

## 2023-11-30 PROCEDURE — 80053 COMPREHEN METABOLIC PANEL: CPT

## 2023-11-30 PROCEDURE — 1159F MED LIST DOCD IN RCRD: CPT | Performed by: FAMILY MEDICINE

## 2023-11-30 PROCEDURE — G0439 PPPS, SUBSEQ VISIT: HCPCS | Performed by: FAMILY MEDICINE

## 2023-11-30 PROCEDURE — 86803 HEPATITIS C AB TEST: CPT

## 2023-11-30 PROCEDURE — 99214 OFFICE O/P EST MOD 30 MIN: CPT | Performed by: FAMILY MEDICINE

## 2023-11-30 PROCEDURE — 1158F ADVNC CARE PLAN TLK DOCD: CPT | Performed by: FAMILY MEDICINE

## 2023-11-30 PROCEDURE — 1126F AMNT PAIN NOTED NONE PRSNT: CPT | Performed by: FAMILY MEDICINE

## 2023-11-30 PROCEDURE — 36415 COLL VENOUS BLD VENIPUNCTURE: CPT

## 2023-11-30 PROCEDURE — 99497 ADVNCD CARE PLAN 30 MIN: CPT | Performed by: FAMILY MEDICINE

## 2023-11-30 PROCEDURE — 3074F SYST BP LT 130 MM HG: CPT | Performed by: FAMILY MEDICINE

## 2023-11-30 PROCEDURE — 81001 URINALYSIS AUTO W/SCOPE: CPT

## 2023-11-30 PROCEDURE — 80061 LIPID PANEL: CPT

## 2023-11-30 PROCEDURE — 1160F RVW MEDS BY RX/DR IN RCRD: CPT | Performed by: FAMILY MEDICINE

## 2023-11-30 PROCEDURE — 3079F DIAST BP 80-89 MM HG: CPT | Performed by: FAMILY MEDICINE

## 2023-11-30 SDOH — ECONOMIC STABILITY: INCOME INSECURITY: IN THE LAST 12 MONTHS, WAS THERE A TIME WHEN YOU WERE NOT ABLE TO PAY THE MORTGAGE OR RENT ON TIME?: NO

## 2023-11-30 SDOH — ECONOMIC STABILITY: FOOD INSECURITY: WITHIN THE PAST 12 MONTHS, THE FOOD YOU BOUGHT JUST DIDN'T LAST AND YOU DIDN'T HAVE MONEY TO GET MORE.: NEVER TRUE

## 2023-11-30 SDOH — ECONOMIC STABILITY: HOUSING INSECURITY
IN THE LAST 12 MONTHS, WAS THERE A TIME WHEN YOU DID NOT HAVE A STEADY PLACE TO SLEEP OR SLEPT IN A SHELTER (INCLUDING NOW)?: NO

## 2023-11-30 SDOH — ECONOMIC STABILITY: FOOD INSECURITY: WITHIN THE PAST 12 MONTHS, YOU WORRIED THAT YOUR FOOD WOULD RUN OUT BEFORE YOU GOT MONEY TO BUY MORE.: NEVER TRUE

## 2023-11-30 SDOH — ECONOMIC STABILITY: TRANSPORTATION INSECURITY
IN THE PAST 12 MONTHS, HAS THE LACK OF TRANSPORTATION KEPT YOU FROM MEDICAL APPOINTMENTS OR FROM GETTING MEDICATIONS?: NO

## 2023-11-30 SDOH — ECONOMIC STABILITY: TRANSPORTATION INSECURITY
IN THE PAST 12 MONTHS, HAS LACK OF TRANSPORTATION KEPT YOU FROM MEETINGS, WORK, OR FROM GETTING THINGS NEEDED FOR DAILY LIVING?: NO

## 2023-11-30 ASSESSMENT — ACTIVITIES OF DAILY LIVING (ADL)
ADEQUATE_TO_COMPLETE_ADL: YES
USING TRANSPORTATION: INDEPENDENT
STIL DRIVING: YES
PREPARING MEALS: INDEPENDENT
ADEQUATE_TO_COMPLETE_ADL: YES
DRESSING: INDEPENDENT
FEEDING YOURSELF: INDEPENDENT
BATHING: INDEPENDENT
JUDGMENT_ADEQUATE_SAFELY_COMPLETE_DAILY_ACTIVITIES: YES
NEEDS ASSISTANCE WITH FOOD: INDEPENDENT
TAKING MEDICATION: INDEPENDENT
PATIENT'S MEMORY ADEQUATE TO SAFELY COMPLETE DAILY ACTIVITIES?: YES
GROOMING: INDEPENDENT
TOILETING: INDEPENDENT
TOILETING: INDEPENDENT
BATHING: INDEPENDENT
DOING HOUSEWORK: INDEPENDENT
USING TELEPHONE: INDEPENDENT
GROCERY SHOPPING: INDEPENDENT
MANAGING FINANCES: INDEPENDENT
DRESSING YOURSELF: INDEPENDENT
JUDGMENT_ADEQUATE_SAFELY_COMPLETE_DAILY_ACTIVITIES: YES
WALKS IN HOME: INDEPENDENT
FEEDING: INDEPENDENT
EATING: INDEPENDENT

## 2023-11-30 ASSESSMENT — ANXIETY QUESTIONNAIRES
1. FEELING NERVOUS, ANXIOUS, OR ON EDGE: NOT AT ALL
2. NOT BEING ABLE TO STOP OR CONTROL WORRYING: NOT AT ALL
4. TROUBLE RELAXING: NOT AT ALL
5. BEING SO RESTLESS THAT IT IS HARD TO SIT STILL: NOT AT ALL
GAD7 TOTAL SCORE: 0
7. FEELING AFRAID AS IF SOMETHING AWFUL MIGHT HAPPEN: NOT AT ALL
6. BECOMING EASILY ANNOYED OR IRRITABLE: NOT AT ALL
3. WORRYING TOO MUCH ABOUT DIFFERENT THINGS: NOT AT ALL

## 2023-11-30 ASSESSMENT — GERIATRIC MINI NUTRITIONAL ASSESSMENT (MNA)
E NEUROPSYCHOLOGICAL PROBLEMS: NO PSYCHOLOGICAL PROBLEMS
C GENERAL MOBILITY: GOES OUT
D HAS SUFFERED PSYCHOLOGICAL STRESS OR ACUTE DISEASE IN THE PAST 3 MONTHS?: NO
A HAS FOOD INTAKE DECLINED OVER THE PAST 3 MONTHS DUE TO LOSS OF APPETITE, DIGESTIVE PROBLEMS, CHEWING OR SWALLOWING DIFFICULTIES?: NO DECREASE IN FOOD INTAKE
B WEIGHT LOSS DURING THE LAST 3 MONTHS: NO WEIGHT LOSS

## 2023-11-30 ASSESSMENT — SOCIAL DETERMINANTS OF HEALTH (SDOH)
WITHIN THE LAST YEAR, HAVE YOU BEEN KICKED, HIT, SLAPPED, OR OTHERWISE PHYSICALLY HURT BY YOUR PARTNER OR EX-PARTNER?: NO
WITHIN THE LAST YEAR, HAVE YOU BEEN AFRAID OF YOUR PARTNER OR EX-PARTNER?: NO
IN THE PAST 12 MONTHS, HAS THE ELECTRIC, GAS, OIL, OR WATER COMPANY THREATENED TO SHUT OFF SERVICE IN YOUR HOME?: NO
HOW HARD IS IT FOR YOU TO PAY FOR THE VERY BASICS LIKE FOOD, HOUSING, MEDICAL CARE, AND HEATING?: NOT HARD AT ALL
WITHIN THE LAST YEAR, HAVE TO BEEN RAPED OR FORCED TO HAVE ANY KIND OF SEXUAL ACTIVITY BY YOUR PARTNER OR EX-PARTNER?: NO
WITHIN THE LAST YEAR, HAVE YOU BEEN HUMILIATED OR EMOTIONALLY ABUSED IN OTHER WAYS BY YOUR PARTNER OR EX-PARTNER?: NO

## 2023-11-30 ASSESSMENT — ENCOUNTER SYMPTOMS
BACK PAIN: 1
ENDOCRINE NEGATIVE: 1
NEUROLOGICAL NEGATIVE: 1
PSYCHIATRIC NEGATIVE: 1
LOSS OF SENSATION IN FEET: 0
GASTROINTESTINAL NEGATIVE: 1
CONSTITUTIONAL NEGATIVE: 1
OCCASIONAL FEELINGS OF UNSTEADINESS: 0
RESPIRATORY NEGATIVE: 1
ARTHRALGIAS: 1
DEPRESSION: 0

## 2023-11-30 ASSESSMENT — PATIENT HEALTH QUESTIONNAIRE - PHQ9
1. LITTLE INTEREST OR PLEASURE IN DOING THINGS: NOT AT ALL
SUM OF ALL RESPONSES TO PHQ9 QUESTIONS 1 & 2: 0
2. FEELING DOWN, DEPRESSED OR HOPELESS: NOT AT ALL

## 2023-11-30 ASSESSMENT — PAIN SCALES - GENERAL: PAINLEVEL: 0-NO PAIN

## 2023-11-30 NOTE — PROGRESS NOTES
"Subjective   Patient ID: Saviat Buchanan is a 68 y.o. female who presents for Annual Exam (Assessment annual medicare wellness fasting bw).    HPI     Review of Systems   Constitutional: Negative.    HENT: Negative.     Respiratory: Negative.     Cardiovascular:         Dr Nur   Gastrointestinal: Negative.    Endocrine: Negative.    Genitourinary: Negative.    Musculoskeletal:  Positive for arthralgias and back pain.   Neurological: Negative.    Psychiatric/Behavioral: Negative.         Objective   /80 (BP Location: Left arm)   Pulse 74   Temp 36.4 °C (97.5 °F) (Temporal)   Ht 1.689 m (5' 6.5\")   Wt 85.1 kg (187 lb 9.6 oz)   SpO2 99%   BMI 29.83 kg/m²     Physical Exam  Vitals and nursing note reviewed.   HENT:      Right Ear: Tympanic membrane normal.      Left Ear: Tympanic membrane normal.      Mouth/Throat:      Pharynx: Oropharynx is clear.   Cardiovascular:      Rate and Rhythm: Normal rate and regular rhythm.      Pulses: Normal pulses.      Heart sounds: Normal heart sounds.   Pulmonary:      Breath sounds: Normal breath sounds.   Abdominal:      Palpations: Abdomen is soft.   Musculoskeletal:         General: Normal range of motion.   Neurological:      General: No focal deficit present.      Mental Status: She is alert and oriented to person, place, and time.   Psychiatric:         Mood and Affect: Mood normal.         Behavior: Behavior normal.         Assessment/Plan patient seen here for an annual Medicare wellness exam.  We reviewed her questionnaire she is agreeable to her responses.  We did discuss advanced directives.  She has no significant difficulty with depression or anxiety.  Redrawing her lab work here today let her know those results soon as available.  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

## 2023-11-30 NOTE — ACP (ADVANCE CARE PLANNING)
Confirming Previous Code Status:   Advance Care Planning Note     Discussion Date: 11/30/23   Discussion Participants: patient    The patient wishes to discuss Advance Care Planning today and the following is a brief summary of our discussion.     Patient has capacity to make their own medical decisions: Yes  Health Care Agent/Surrogate Decision Maker documented in chart: Yes    Documents on file and valid:  Advance Directive/Living Will: Yes   Health Care Power of : Yes  Other: none    Communication of Medical Status/Prognosis:   yes     Communication of Treatment Goals/Options:   yes     Treatment Decisions  Goals of Care: survival is paramount regardless of prognosis, treatment outcome, or burden   yes  Follow Up Plan  no  Team Members  myself  Time Statement: Total face to face time spent on advance care planning was 16 minutes with 16 minutes spent in counseling, including the explanation.    James Franklin,   11/30/2023 3:49 PM

## 2023-12-01 LAB
ALBUMIN SERPL BCP-MCNC: 4.5 G/DL (ref 3.4–5)
ALP SERPL-CCNC: 82 U/L (ref 33–136)
ALT SERPL W P-5'-P-CCNC: 34 U/L (ref 7–45)
ANION GAP SERPL CALC-SCNC: 17 MMOL/L (ref 10–20)
AST SERPL W P-5'-P-CCNC: 31 U/L (ref 9–39)
BASOPHILS # BLD AUTO: 0.07 X10*3/UL (ref 0–0.1)
BASOPHILS NFR BLD AUTO: 0.6 %
BILIRUB SERPL-MCNC: 0.5 MG/DL (ref 0–1.2)
BUN SERPL-MCNC: 15 MG/DL (ref 6–23)
CALCIUM SERPL-MCNC: 10 MG/DL (ref 8.6–10.6)
CHLORIDE SERPL-SCNC: 105 MMOL/L (ref 98–107)
CHOLEST SERPL-MCNC: 140 MG/DL (ref 0–199)
CHOLESTEROL/HDL RATIO: 3.1
CO2 SERPL-SCNC: 27 MMOL/L (ref 21–32)
CREAT SERPL-MCNC: 0.66 MG/DL (ref 0.5–1.05)
EOSINOPHIL # BLD AUTO: 0.21 X10*3/UL (ref 0–0.7)
EOSINOPHIL NFR BLD AUTO: 1.9 %
ERYTHROCYTE [DISTWIDTH] IN BLOOD BY AUTOMATED COUNT: 14.4 % (ref 11.5–14.5)
GFR SERPL CREATININE-BSD FRML MDRD: >90 ML/MIN/1.73M*2
GLUCOSE SERPL-MCNC: 82 MG/DL (ref 74–99)
HCT VFR BLD AUTO: 46.5 % (ref 36–46)
HCV AB SER QL: NONREACTIVE
HDLC SERPL-MCNC: 45.4 MG/DL
HGB BLD-MCNC: 14.7 G/DL (ref 12–16)
IMM GRANULOCYTES # BLD AUTO: 0.05 X10*3/UL (ref 0–0.7)
IMM GRANULOCYTES NFR BLD AUTO: 0.5 % (ref 0–0.9)
LDLC SERPL CALC-MCNC: 44 MG/DL
LYMPHOCYTES # BLD AUTO: 3.88 X10*3/UL (ref 1.2–4.8)
LYMPHOCYTES NFR BLD AUTO: 35 %
MCH RBC QN AUTO: 29 PG (ref 26–34)
MCHC RBC AUTO-ENTMCNC: 31.6 G/DL (ref 32–36)
MCV RBC AUTO: 92 FL (ref 80–100)
MONOCYTES # BLD AUTO: 0.72 X10*3/UL (ref 0.1–1)
MONOCYTES NFR BLD AUTO: 6.5 %
MUCOUS THREADS #/AREA URNS AUTO: ABNORMAL /LPF
NEUTROPHILS # BLD AUTO: 6.17 X10*3/UL (ref 1.2–7.7)
NEUTROPHILS NFR BLD AUTO: 55.5 %
NON HDL CHOLESTEROL: 95 MG/DL (ref 0–149)
NRBC BLD-RTO: 0 /100 WBCS (ref 0–0)
PLATELET # BLD AUTO: 266 X10*3/UL (ref 150–450)
POTASSIUM SERPL-SCNC: 4.3 MMOL/L (ref 3.5–5.3)
PROT SERPL-MCNC: 7.2 G/DL (ref 6.4–8.2)
RBC # BLD AUTO: 5.07 X10*6/UL (ref 4–5.2)
RBC #/AREA URNS AUTO: >20 /HPF
SODIUM SERPL-SCNC: 145 MMOL/L (ref 136–145)
TRIGL SERPL-MCNC: 255 MG/DL (ref 0–149)
VLDL: 51 MG/DL (ref 0–40)
WBC # BLD AUTO: 11.1 X10*3/UL (ref 4.4–11.3)
WBC #/AREA URNS AUTO: ABNORMAL /HPF

## 2023-12-07 ENCOUNTER — ANTICOAGULATION - WARFARIN VISIT (OUTPATIENT)
Dept: PHARMACY | Facility: CLINIC | Age: 68
End: 2023-12-07
Payer: MEDICARE

## 2023-12-07 DIAGNOSIS — I48.91 ATRIAL FIBRILLATION, UNSPECIFIED TYPE (MULTI): Primary | ICD-10-CM

## 2023-12-07 LAB
POC INR: 3.1
POC PROTHROMBIN TIME: NORMAL

## 2023-12-07 PROCEDURE — 85610 PROTHROMBIN TIME: CPT | Mod: QW | Performed by: PHARMACIST

## 2023-12-07 PROCEDURE — 99212 OFFICE O/P EST SF 10 MIN: CPT | Performed by: PHARMACIST

## 2023-12-07 NOTE — PROGRESS NOTES
Verified current dose with pt.   No new meds since last visit  Pt stopped fish oil about 3 months ago.    Flecainide was increased from 100 mg to 150 mg.  Pt denies unusual bleed/bruise.  No upcoming procedures.  Inr = 3.1  Continue same dose and check again in 5 weeks.      Called [facility].  Left   Warfarin 4 mg tablet  Take 4 mg on Mon and Fri and 2 mg all other days or UD.    #65 (90 day) 1 rf  Dr. Nur

## 2023-12-11 ENCOUNTER — HOSPITAL ENCOUNTER (OUTPATIENT)
Dept: CARDIOLOGY | Facility: CLINIC | Age: 68
Discharge: HOME | End: 2023-12-11
Payer: MEDICARE

## 2023-12-11 VITALS
SYSTOLIC BLOOD PRESSURE: 124 MMHG | DIASTOLIC BLOOD PRESSURE: 80 MMHG | BODY MASS INDEX: 30.05 KG/M2 | HEIGHT: 66 IN | WEIGHT: 187 LBS

## 2023-12-11 DIAGNOSIS — I48.0 PAROXYSMAL ATRIAL FIBRILLATION (MULTI): ICD-10-CM

## 2023-12-11 PROCEDURE — 93306 TTE W/DOPPLER COMPLETE: CPT

## 2023-12-11 PROCEDURE — 93306 TTE W/DOPPLER COMPLETE: CPT | Performed by: INTERNAL MEDICINE

## 2023-12-12 DIAGNOSIS — I10 HYPERTENSION, BENIGN: ICD-10-CM

## 2023-12-13 LAB
AORTIC VALVE MEAN GRADIENT: 4
AORTIC VALVE PEAK VELOCITY: 1.33
AV PEAK GRADIENT: 7.1
AVA (PEAK VEL): 2.15
AVA (VTI): 1.84
EJECTION FRACTION APICAL 4 CHAMBER: 63.1
EJECTION FRACTION: 60
LEFT ATRIUM VOLUME AREA LENGTH INDEX BSA: 32.3
LEFT VENTRICLE INTERNAL DIMENSION DIASTOLE: 4.16 (ref 3.5–6)
LEFT VENTRICULAR OUTFLOW TRACT DIAMETER: 1.67
RIGHT VENTRICLE FREE WALL PEAK S': 0.1

## 2023-12-13 RX ORDER — LISINOPRIL 20 MG/1
20 TABLET ORAL DAILY
Qty: 90 TABLET | Refills: 2 | Status: SHIPPED | OUTPATIENT
Start: 2023-12-13

## 2023-12-15 ENCOUNTER — OFFICE VISIT (OUTPATIENT)
Dept: CARDIOLOGY | Facility: CLINIC | Age: 68
End: 2023-12-15
Payer: MEDICARE

## 2023-12-15 VITALS
WEIGHT: 189 LBS | HEART RATE: 78 BPM | HEIGHT: 67 IN | BODY MASS INDEX: 29.66 KG/M2 | SYSTOLIC BLOOD PRESSURE: 120 MMHG | OXYGEN SATURATION: 98 % | DIASTOLIC BLOOD PRESSURE: 80 MMHG

## 2023-12-15 DIAGNOSIS — I48.0 PAROXYSMAL ATRIAL FIBRILLATION (MULTI): ICD-10-CM

## 2023-12-15 PROCEDURE — 99215 OFFICE O/P EST HI 40 MIN: CPT | Performed by: INTERNAL MEDICINE

## 2023-12-15 PROCEDURE — 1126F AMNT PAIN NOTED NONE PRSNT: CPT | Performed by: INTERNAL MEDICINE

## 2023-12-15 PROCEDURE — 1159F MED LIST DOCD IN RCRD: CPT | Performed by: INTERNAL MEDICINE

## 2023-12-15 PROCEDURE — 1158F ADVNC CARE PLAN TLK DOCD: CPT | Performed by: INTERNAL MEDICINE

## 2023-12-15 PROCEDURE — 3079F DIAST BP 80-89 MM HG: CPT | Performed by: INTERNAL MEDICINE

## 2023-12-15 PROCEDURE — 1160F RVW MEDS BY RX/DR IN RCRD: CPT | Performed by: INTERNAL MEDICINE

## 2023-12-15 PROCEDURE — 3074F SYST BP LT 130 MM HG: CPT | Performed by: INTERNAL MEDICINE

## 2023-12-15 NOTE — LETTER
December 16, 2023     James Franklin DO  [address]  [address]  [facility] [address]    Patient: Savita Buchanan   YOB: 1955   Date of Visit: 12/15/2023       Dear Dr. James Franklin DO:    Thank you for referring Savita Buchanan to me for evaluation. Below are my notes for this consultation.  If you have questions, please do not hesitate to call me. I look forward to following your patient along with you.       Sincerely,     James C Ramicone, DO      CC: Deep Nur DO  ______________________________________________________________________________________    Reason For Consult    Atrial fibrillation    History Of Present Illness    This is a 68-year-old female with paroxysmal atrial fibrillation.  She is being seen today in consultation at the request of Dr. Nur.  She has been taking flecainide for several years for AF suppression and has done well up until the past several months.  In October 2023 she had a prolonged symptomatic episode of atrial fibrillation which required an emergency room visit.  Eventually she converted back to sinus rhythm.  Since then she has continued to have more frequent symptomatic recurrences of atrial fibrillation on antiarrhythmic drug therapy.  Outpatient cardiology records reviewed today.    Past medical history:    Paroxysmal atrial fibrillation  Obstructive sleep apnea  Hypertension  History of back surgery         Review of systems  Other review of systems negative other than as outlined in HPI     Social History  She reports that she has been smoking cigarettes. She has never used smokeless tobacco. No history on file for alcohol use and drug use.    Family History  Family History   Problem Relation Name Age of Onset   • Aortic aneurysm Mother     • Other (cardiac disorder) Father     • Other (cardiac disorder) Sister     • Aortic aneurysm Other     • Other (cardiac disorder) Other          Allergies  Penicillins, Latex,  "Phenobarbital, Sulfa (sulfonamide antibiotics), and Neomycin-bacitracin-polymyxin    Medications  Current Outpatient Medications   Medication Instructions   • ascorbic acid (Vitamin C) 500 mg tablet 1 tablet, oral, Daily   • cholecalciferol, vitamin D3, 250 mcg (10,000 unit) capsule 1 capsule, oral, Daily   • cyanocobalamin (Vitamin B-12) 100 mcg tablet 1 tablet, oral, Daily   • flecainide (TAMBOCOR) 150 mg, oral, 2 times daily   • gabapentin (NEURONTIN) 300 mg, oral, 2 times daily   • Lactobacillus acidophilus (PROBIOTIC ORAL) 1 tablet, oral, Daily   • lisinopriL-hydrochlorothiazide 20-12.5 mg tablet 1 tablet, oral, Daily   • lisinopril 20 mg, oral, Daily   • magnesium oxide 500 mg tablet 1 tablet, oral, Daily   • melatonin 5 mg, oral, Nightly   • metoprolol succinate XL (TOPROL-XL) 50 mg, oral, Daily, Do not crush or chew.   • multivitamin tablet 1 tablet, oral, Daily   • potassium chloride CR (Klor-Con M20) 20 mEq ER tablet 1 tablet, oral, Every 12 hours   • simvastatin (Zocor) 10 mg tablet TAKE ONE TABLET BY MOUTH EVERY DAY   • warfarin (COUMADIN) 2 mg, oral, 5 times weekly, On Tuesday, Wednesday, Thursday, Saturday, and Sunday   • warfarin (JANTOVEN) 4 mg, oral, 2 times weekly, On Monday and Friday   • zinc sulfate (Zincate) 220 (50 Zn) MG capsule 1 capsule, oral, Daily         Vitals      11/9/2023     1:36 PM 11/9/2023     4:42 PM 11/28/2023     2:42 PM 11/28/2023     3:05 PM 11/30/2023     3:28 PM 12/11/2023     8:57 AM 12/15/2023    11:25 AM   Vitals   Systolic 128 144 118 132 124 124 120   Diastolic 96 73 66 76 80 80 80   Heart Rate 108 79 68  74  78   Temp     36.4 °C (97.5 °F)     Resp 16 16 16       Height (in)     1.689 m (5' 6.5\") 1.676 m (5' 6\") 1.689 m (5' 6.5\")   Weight (lb)   190  187.6 187 189   BMI   30.67 kg/m2  29.83 kg/m2 30.18 kg/m2 30.05 kg/m2   BSA (m2)   2 m2  2 m2 1.99 m2 2.01 m2   Visit Report   Report Report Report  Report        Physical Exam    General Appearance:  Alert, oriented, " no distress  Skin:  Warm and dry  Head and Neck:  No elevation of JVP, no carotid bruits  Cardiac Exam:  Rhythm is regular, S1 and S2 are normal, grade 1/6 systolic murmur along the left sternal border  Lungs:  Clear to auscultation  Extremities:  no edema  Neurologic:  No focal deficits  Psychiatric:  Appropriate mood and behavior       Test Results    I have reviewed the following diagnostic studies and my interpretation is as follows:    EKG June 2023: Sinus rhythm, no ischemic changes  Echocardiogram: Normal left ventricular function with moderate to severe mitral valve regurgitation     Assessment/Plan  Problem List Items Addressed This Visit             ICD-10-CM    Paroxysmal atrial fibrillation (CMS/HCC) I48.0     1.  Paroxysmal atrial fibrillation:  Savita has a history of symptomatic paroxysmal atrial fibrillation which was well-controlled on antiarrhythmic drug therapy for several years.  She has been on flecainide since at least 2016.  However, over the past few months she has had a dramatic increase in the frequency and severity of the AF episodes and she is remaining in atrial fibrillation for several hours at a time.  She is now on the maximum dosage of flecainide 150 mg twice daily.  Given the progression of the atrial fibrillation, aggressive rhythm control with pulmonary vein isolation is recommended.  The cryo PVI procedure, risks, and alternatives were discussed today.  The patient is in agreement with the recommendation and the ablation procedure will be scheduled at [facility] in the near future.                  James C Ramicone, DO

## 2023-12-15 NOTE — PATIENT INSTRUCTIONS
Cryoablation for atrial fibrillation will be scheduled at [facility].    INR 2-3 days before procedure.

## 2023-12-16 NOTE — ASSESSMENT & PLAN NOTE
1.  Paroxysmal atrial fibrillation:  Savita has a history of symptomatic paroxysmal atrial fibrillation which was well-controlled on antiarrhythmic drug therapy for several years.  She has been on flecainide since at least 2016.  However, over the past few months she has had a dramatic increase in the frequency and severity of the AF episodes and she is remaining in atrial fibrillation for several hours at a time.  She is now on the maximum dosage of flecainide 150 mg twice daily.  Given the progression of the atrial fibrillation, aggressive rhythm control with pulmonary vein isolation is recommended.  The cryo PVI procedure, risks, and alternatives were discussed today.  The patient is in agreement with the recommendation and the ablation procedure will be scheduled at [facility] in the near future.

## 2023-12-16 NOTE — PROGRESS NOTES
Reason For Consult    Atrial fibrillation    History Of Present Illness    This is a 68-year-old female with paroxysmal atrial fibrillation.  She is being seen today in consultation at the request of Dr. Nur.  She has been taking flecainide for several years for AF suppression and has done well up until the past several months.  In October 2023 she had a prolonged symptomatic episode of atrial fibrillation which required an emergency room visit.  Eventually she converted back to sinus rhythm.  Since then she has continued to have more frequent symptomatic recurrences of atrial fibrillation on antiarrhythmic drug therapy.  Outpatient cardiology records reviewed today.    Past medical history:    Paroxysmal atrial fibrillation  Obstructive sleep apnea  Hypertension  History of back surgery         Review of systems  Other review of systems negative other than as outlined in HPI     Social History  She reports that she has been smoking cigarettes. She has never used smokeless tobacco. No history on file for alcohol use and drug use.    Family History  Family History   Problem Relation Name Age of Onset    Aortic aneurysm Mother      Other (cardiac disorder) Father      Other (cardiac disorder) Sister      Aortic aneurysm Other      Other (cardiac disorder) Other          Allergies  Penicillins, Latex, Phenobarbital, Sulfa (sulfonamide antibiotics), and Neomycin-bacitracin-polymyxin    Medications  Current Outpatient Medications   Medication Instructions    ascorbic acid (Vitamin C) 500 mg tablet 1 tablet, oral, Daily    cholecalciferol, vitamin D3, 250 mcg (10,000 unit) capsule 1 capsule, oral, Daily    cyanocobalamin (Vitamin B-12) 100 mcg tablet 1 tablet, oral, Daily    flecainide (TAMBOCOR) 150 mg, oral, 2 times daily    gabapentin (NEURONTIN) 300 mg, oral, 2 times daily    Lactobacillus acidophilus (PROBIOTIC ORAL) 1 tablet, oral, Daily    lisinopriL-hydrochlorothiazide 20-12.5 mg tablet 1 tablet, oral, Daily  "   lisinopril 20 mg, oral, Daily    magnesium oxide 500 mg tablet 1 tablet, oral, Daily    melatonin 5 mg, oral, Nightly    metoprolol succinate XL (TOPROL-XL) 50 mg, oral, Daily, Do not crush or chew.    multivitamin tablet 1 tablet, oral, Daily    potassium chloride CR (Klor-Con M20) 20 mEq ER tablet 1 tablet, oral, Every 12 hours    simvastatin (Zocor) 10 mg tablet TAKE ONE TABLET BY MOUTH EVERY DAY    warfarin (COUMADIN) 2 mg, oral, 5 times weekly, On Tuesday, Wednesday, Thursday, Saturday, and Sunday    warfarin (JANTOVEN) 4 mg, oral, 2 times weekly, On Monday and Friday    zinc sulfate (Zincate) 220 (50 Zn) MG capsule 1 capsule, oral, Daily         Vitals      11/9/2023     1:36 PM 11/9/2023     4:42 PM 11/28/2023     2:42 PM 11/28/2023     3:05 PM 11/30/2023     3:28 PM 12/11/2023     8:57 AM 12/15/2023    11:25 AM   Vitals   Systolic 128 144 118 132 124 124 120   Diastolic 96 73 66 76 80 80 80   Heart Rate 108 79 68  74  78   Temp     36.4 °C (97.5 °F)     Resp 16 16 16       Height (in)     1.689 m (5' 6.5\") 1.676 m (5' 6\") 1.689 m (5' 6.5\")   Weight (lb)   190  187.6 187 189   BMI   30.67 kg/m2  29.83 kg/m2 30.18 kg/m2 30.05 kg/m2   BSA (m2)   2 m2  2 m2 1.99 m2 2.01 m2   Visit Report   Report Report Report  Report        Physical Exam    General Appearance:  Alert, oriented, no distress  Skin:  Warm and dry  Head and Neck:  No elevation of JVP, no carotid bruits  Cardiac Exam:  Rhythm is regular, S1 and S2 are normal, grade 1/6 systolic murmur along the left sternal border  Lungs:  Clear to auscultation  Extremities:  no edema  Neurologic:  No focal deficits  Psychiatric:  Appropriate mood and behavior       Test Results    I have reviewed the following diagnostic studies and my interpretation is as follows:    EKG June 2023: Sinus rhythm, no ischemic changes  Echocardiogram: Normal left ventricular function with moderate to severe mitral valve regurgitation     Assessment/Plan  Problem List Items " Addressed This Visit             ICD-10-CM    Paroxysmal atrial fibrillation (CMS/[facility]) I48.0     1.  Paroxysmal atrial fibrillation:  Savita has a history of symptomatic paroxysmal atrial fibrillation which was well-controlled on antiarrhythmic drug therapy for several years.  She has been on flecainide since at least 2016.  However, over the past few months she has had a dramatic increase in the frequency and severity of the AF episodes and she is remaining in atrial fibrillation for several hours at a time.  She is now on the maximum dosage of flecainide 150 mg twice daily.  Given the progression of the atrial fibrillation, aggressive rhythm control with pulmonary vein isolation is recommended.  The cryo PVI procedure, risks, and alternatives were discussed today.  The patient is in agreement with the recommendation and the ablation procedure will be scheduled at [facility] in the near future.                  James C Ramicone, DO

## 2023-12-19 ENCOUNTER — DOCUMENTATION (OUTPATIENT)
Dept: PHARMACY | Facility: CLINIC | Age: 68
End: 2023-12-19
Payer: MEDICARE

## 2023-12-19 NOTE — PROGRESS NOTES
Patient called today - pt is having an oblation with Dr. Ramicone on 1/18/24 and needed to reschedule her INR check on 1/15/24 per MD instructions.

## 2023-12-26 DIAGNOSIS — G62.9 NEUROPATHY: ICD-10-CM

## 2023-12-26 RX ORDER — GABAPENTIN 300 MG/1
300 CAPSULE ORAL 2 TIMES DAILY
Qty: 180 CAPSULE | Refills: 1 | Status: SHIPPED | OUTPATIENT
Start: 2023-12-26

## 2023-12-27 LAB
ATRIAL RATE: 227 BPM
Q ONSET: 217 MS
QRS COUNT: 18 BEATS
QRS DURATION: 80 MS
QT INTERVAL: 356 MS
QTC CALCULATION(BAZETT): 475 MS
QTC FREDERICIA: 431 MS
R AXIS: 34 DEGREES
T AXIS: -17 DEGREES
T OFFSET: 395 MS
VENTRICULAR RATE: 107 BPM

## 2024-01-02 DIAGNOSIS — E78.00 PURE HYPERCHOLESTEROLEMIA: ICD-10-CM

## 2024-01-02 RX ORDER — SIMVASTATIN 10 MG/1
TABLET, FILM COATED ORAL
Qty: 90 TABLET | Refills: 0 | Status: SHIPPED | OUTPATIENT
Start: 2024-01-02 | End: 2024-04-02 | Stop reason: SDUPTHER

## 2024-01-11 ENCOUNTER — APPOINTMENT (OUTPATIENT)
Dept: PHARMACY | Facility: CLINIC | Age: 69
End: 2024-01-11
Payer: MEDICARE

## 2024-01-12 ENCOUNTER — APPOINTMENT (OUTPATIENT)
Dept: RADIOLOGY | Facility: HOSPITAL | Age: 69
End: 2024-01-12
Payer: MEDICARE

## 2024-01-12 ENCOUNTER — HOSPITAL ENCOUNTER (OUTPATIENT)
Dept: CARDIOLOGY | Facility: HOSPITAL | Age: 69
Discharge: HOME | End: 2024-01-12
Payer: MEDICARE

## 2024-01-12 ENCOUNTER — HOSPITAL ENCOUNTER (EMERGENCY)
Facility: HOSPITAL | Age: 69
Discharge: HOME | End: 2024-01-12
Attending: EMERGENCY MEDICINE
Payer: MEDICARE

## 2024-01-12 VITALS
HEIGHT: 66 IN | RESPIRATION RATE: 19 BRPM | BODY MASS INDEX: 28.93 KG/M2 | WEIGHT: 180 LBS | OXYGEN SATURATION: 96 % | DIASTOLIC BLOOD PRESSURE: 82 MMHG | TEMPERATURE: 96.8 F | SYSTOLIC BLOOD PRESSURE: 122 MMHG | HEART RATE: 101 BPM

## 2024-01-12 DIAGNOSIS — I48.0 PAROXYSMAL ATRIAL FIBRILLATION (MULTI): Primary | ICD-10-CM

## 2024-01-12 LAB
ALBUMIN SERPL BCP-MCNC: 4.4 G/DL (ref 3.4–5)
ALP SERPL-CCNC: 82 U/L (ref 33–136)
ALT SERPL W P-5'-P-CCNC: 32 U/L (ref 7–45)
ANION GAP SERPL CALC-SCNC: 16 MMOL/L (ref 10–20)
AST SERPL W P-5'-P-CCNC: 30 U/L (ref 9–39)
BASOPHILS # BLD AUTO: 0.06 X10*3/UL (ref 0–0.1)
BASOPHILS NFR BLD AUTO: 0.4 %
BILIRUB SERPL-MCNC: 0.4 MG/DL (ref 0–1.2)
BNP SERPL-MCNC: 158 PG/ML (ref 0–99)
BUN SERPL-MCNC: 17 MG/DL (ref 6–23)
CALCIUM SERPL-MCNC: 9.8 MG/DL (ref 8.6–10.3)
CARDIAC TROPONIN I PNL SERPL HS: 3 NG/L (ref 0–13)
CHLORIDE SERPL-SCNC: 104 MMOL/L (ref 98–107)
CO2 SERPL-SCNC: 25 MMOL/L (ref 21–32)
CREAT SERPL-MCNC: 0.71 MG/DL (ref 0.5–1.05)
EGFRCR SERPLBLD CKD-EPI 2021: >90 ML/MIN/1.73M*2
EOSINOPHIL # BLD AUTO: 0.14 X10*3/UL (ref 0–0.7)
EOSINOPHIL NFR BLD AUTO: 1 %
ERYTHROCYTE [DISTWIDTH] IN BLOOD BY AUTOMATED COUNT: 13.9 % (ref 11.5–14.5)
GLUCOSE SERPL-MCNC: 117 MG/DL (ref 74–99)
HCT VFR BLD AUTO: 47.8 % (ref 36–46)
HGB BLD-MCNC: 15.9 G/DL (ref 12–16)
IMM GRANULOCYTES # BLD AUTO: 0.07 X10*3/UL (ref 0–0.7)
IMM GRANULOCYTES NFR BLD AUTO: 0.5 % (ref 0–0.9)
INR PPP: 2.6 (ref 0.9–1.1)
LYMPHOCYTES # BLD AUTO: 2.75 X10*3/UL (ref 1.2–4.8)
LYMPHOCYTES NFR BLD AUTO: 20.3 %
MAGNESIUM SERPL-MCNC: 1.66 MG/DL (ref 1.6–2.4)
MCH RBC QN AUTO: 29.8 PG (ref 26–34)
MCHC RBC AUTO-ENTMCNC: 33.3 G/DL (ref 32–36)
MCV RBC AUTO: 90 FL (ref 80–100)
MONOCYTES # BLD AUTO: 0.79 X10*3/UL (ref 0.1–1)
MONOCYTES NFR BLD AUTO: 5.8 %
NEUTROPHILS # BLD AUTO: 9.74 X10*3/UL (ref 1.2–7.7)
NEUTROPHILS NFR BLD AUTO: 72 %
NRBC BLD-RTO: 0 /100 WBCS (ref 0–0)
PLATELET # BLD AUTO: 282 X10*3/UL (ref 150–450)
POTASSIUM SERPL-SCNC: 4.1 MMOL/L (ref 3.5–5.3)
PROT SERPL-MCNC: 7.7 G/DL (ref 6.4–8.2)
PROTHROMBIN TIME: 29.5 SECONDS (ref 9.8–12.8)
RBC # BLD AUTO: 5.33 X10*6/UL (ref 4–5.2)
SODIUM SERPL-SCNC: 141 MMOL/L (ref 136–145)
WBC # BLD AUTO: 13.6 X10*3/UL (ref 4.4–11.3)

## 2024-01-12 PROCEDURE — 84484 ASSAY OF TROPONIN QUANT: CPT | Performed by: EMERGENCY MEDICINE

## 2024-01-12 PROCEDURE — 71045 X-RAY EXAM CHEST 1 VIEW: CPT

## 2024-01-12 PROCEDURE — 85025 COMPLETE CBC W/AUTO DIFF WBC: CPT | Performed by: EMERGENCY MEDICINE

## 2024-01-12 PROCEDURE — 96361 HYDRATE IV INFUSION ADD-ON: CPT

## 2024-01-12 PROCEDURE — 83735 ASSAY OF MAGNESIUM: CPT | Performed by: EMERGENCY MEDICINE

## 2024-01-12 PROCEDURE — 96374 THER/PROPH/DIAG INJ IV PUSH: CPT

## 2024-01-12 PROCEDURE — 99284 EMERGENCY DEPT VISIT MOD MDM: CPT | Performed by: EMERGENCY MEDICINE

## 2024-01-12 PROCEDURE — 71045 X-RAY EXAM CHEST 1 VIEW: CPT | Mod: FOREIGN READ | Performed by: RADIOLOGY

## 2024-01-12 PROCEDURE — 2500000005 HC RX 250 GENERAL PHARMACY W/O HCPCS: Performed by: EMERGENCY MEDICINE

## 2024-01-12 PROCEDURE — 36415 COLL VENOUS BLD VENIPUNCTURE: CPT | Performed by: EMERGENCY MEDICINE

## 2024-01-12 PROCEDURE — 85610 PROTHROMBIN TIME: CPT | Performed by: EMERGENCY MEDICINE

## 2024-01-12 PROCEDURE — 2500000004 HC RX 250 GENERAL PHARMACY W/ HCPCS (ALT 636 FOR OP/ED): Performed by: EMERGENCY MEDICINE

## 2024-01-12 PROCEDURE — 84075 ASSAY ALKALINE PHOSPHATASE: CPT | Performed by: EMERGENCY MEDICINE

## 2024-01-12 PROCEDURE — 93005 ELECTROCARDIOGRAM TRACING: CPT

## 2024-01-12 PROCEDURE — 83880 ASSAY OF NATRIURETIC PEPTIDE: CPT | Performed by: EMERGENCY MEDICINE

## 2024-01-12 RX ORDER — METOPROLOL TARTRATE 1 MG/ML
5 INJECTION, SOLUTION INTRAVENOUS ONCE
Status: COMPLETED | OUTPATIENT
Start: 2024-01-12 | End: 2024-01-12

## 2024-01-12 RX ORDER — METOPROLOL TARTRATE 1 MG/ML
5 INJECTION, SOLUTION INTRAVENOUS EVERY 10 MIN PRN
Status: DISCONTINUED | OUTPATIENT
Start: 2024-01-12 | End: 2024-01-12 | Stop reason: HOSPADM

## 2024-01-12 RX ADMIN — METOPROLOL TARTRATE 5 MG: 5 INJECTION INTRAVENOUS at 12:38

## 2024-01-12 RX ADMIN — SODIUM CHLORIDE 500 ML: 9 INJECTION, SOLUTION INTRAVENOUS at 12:41

## 2024-01-12 ASSESSMENT — COLUMBIA-SUICIDE SEVERITY RATING SCALE - C-SSRS
2. HAVE YOU ACTUALLY HAD ANY THOUGHTS OF KILLING YOURSELF?: NO
6. HAVE YOU EVER DONE ANYTHING, STARTED TO DO ANYTHING, OR PREPARED TO DO ANYTHING TO END YOUR LIFE?: NO
1. IN THE PAST MONTH, HAVE YOU WISHED YOU WERE DEAD OR WISHED YOU COULD GO TO SLEEP AND NOT WAKE UP?: NO

## 2024-01-12 ASSESSMENT — PAIN SCALES - GENERAL
PAINLEVEL_OUTOF10: 0 - NO PAIN
PAINLEVEL_OUTOF10: 5 - MODERATE PAIN

## 2024-01-12 ASSESSMENT — PAIN DESCRIPTION - PAIN TYPE: TYPE: CHRONIC PAIN

## 2024-01-12 ASSESSMENT — LIFESTYLE VARIABLES
REASON UNABLE TO ASSESS: NO
HAVE PEOPLE ANNOYED YOU BY CRITICIZING YOUR DRINKING: NO
EVER FELT BAD OR GUILTY ABOUT YOUR DRINKING: NO
HAVE YOU EVER FELT YOU SHOULD CUT DOWN ON YOUR DRINKING: NO
EVER HAD A DRINK FIRST THING IN THE MORNING TO STEADY YOUR NERVES TO GET RID OF A HANGOVER: NO

## 2024-01-12 ASSESSMENT — PAIN - FUNCTIONAL ASSESSMENT: PAIN_FUNCTIONAL_ASSESSMENT: 0-10

## 2024-01-12 ASSESSMENT — PAIN DESCRIPTION - DESCRIPTORS: DESCRIPTORS: ACHING

## 2024-01-12 ASSESSMENT — PAIN DESCRIPTION - FREQUENCY: FREQUENCY: CONSTANT/CONTINUOUS

## 2024-01-12 ASSESSMENT — PAIN DESCRIPTION - LOCATION: LOCATION: BACK

## 2024-01-12 NOTE — ED PROVIDER NOTES
HPI   Chief Complaint   Patient presents with    Irregular Heart Beat     Pt states she is in A-fib       Patient is a pleasant 68-year-old female, medical history significant for atrial fibrillation who presents to the emergency department with heart racing, chest tightness, and exertional dyspnea.  Patient states she has a history of paroxysmal atrial fibrillation.  She states that she popped into A-fib on Wednesday night.  Since then, she is felt like she had a hard time catching her breath.  She has developed difficulty laying flat.  She states that she gets winded with very simple activities.  She has had some occasional chest pain.  She has been compliant with her medications.  She is on flecainide 150 mg twice a day.  She is also on metoprolol.  She is actually scheduled for an ablation next week.                          No data recorded                Patient History   Past Medical History:   Diagnosis Date    A-fib (CMS/[facility])     Personal history of Methicillin resistant Staphylococcus aureus infection     History of methicillin resistant Staphylococcus aureus infection    Personal history of other diseases of the musculoskeletal system and connective tissue     History of backache    Personal history of other drug therapy 10/12/2016    History of influenza vaccination    Personal history of urinary (tract) infections     History of urinary tract infection     Past Surgical History:   Procedure Laterality Date    BACK SURGERY  03/15/2016    Back Surgery    TONSILLECTOMY  03/15/2016    Tonsillectomy     Family History   Problem Relation Name Age of Onset    Aortic aneurysm Mother      Other (cardiac disorder) Father      Other (cardiac disorder) Sister      Aortic aneurysm Other      Other (cardiac disorder) Other       Social History     Tobacco Use    Smoking status: Every Day     Types: Cigarettes    Smokeless tobacco: Never   Substance Use Topics    Alcohol use: Not on file    Drug use: Not on file        Physical Exam   ED Triage Vitals [01/12/24 1003]   Temp Heart Rate Resp BP   36 °C (96.8 °F) 96 18 (!) 163/93      SpO2 Temp src Heart Rate Source Patient Position   97 % -- -- --      BP Location FiO2 (%)     -- --       Physical Exam  Vitals and nursing note reviewed.   Constitutional:       General: She is not in acute distress.     Appearance: She is well-developed.   HENT:      Head: Normocephalic and atraumatic.   Eyes:      Conjunctiva/sclera: Conjunctivae normal.   Cardiovascular:      Rate and Rhythm: Tachycardia present. Rhythm irregular.      Heart sounds: No murmur heard.  Pulmonary:      Effort: Pulmonary effort is normal. No respiratory distress.      Breath sounds: Normal breath sounds.   Abdominal:      Palpations: Abdomen is soft.      Tenderness: There is no abdominal tenderness.   Musculoskeletal:         General: No swelling.      Cervical back: Neck supple.   Skin:     General: Skin is warm and dry.      Capillary Refill: Capillary refill takes less than 2 seconds.   Neurological:      General: No focal deficit present.      Mental Status: She is alert and oriented to person, place, and time.   Psychiatric:         Mood and Affect: Mood normal.         ED Course & MDM   ED Course as of 01/12/24 1840 Fri Jan 12, 2024   1216 INR therapeutic at 2.6. [MK]   1216 CBC shows mild leukocytosis of 13.1, otherwise unremarkable. [MK]   1228 BNP mildly elevated at 158. [MK]   1244 Potassium magnesium normal.  High sensitive troponin normal at 3. [MK]      ED Course User Index  [MK] Smith Joel MD         Diagnoses as of 01/12/24 1840   Paroxysmal atrial fibrillation (CMS/HCC)       Medical Decision Making  Medical Decision Making: Patient presents to the emergency department in atrial fibrillation.  She has borderline RVR with rates anywhere from .  She did complain of some exertional dyspnea.  Metabolic workup was pursued.  Patient's labs were essentially unremarkable.  She was given 5  mg of metoprolol and heart rate was still in the 90s.  I did discuss options with the patient.  She is against any form of cardioversion at this time.  I did reach out to cardiology.  They are comfortable with the patient following up as initially scheduled for her ablation and the patient will be discharged.    EKG interpreted by myself (ED attending physician): [EKG demonstrates atrial fibrillation.  There is a rate of 107.  There is multiple PVCs.  There is no definitive acute ischemia.  There is no STEMI.    Differential Diagnoses Considered: A-fib RVR, NSTEMI, CHF, medication noncompliance    Chronic Medical Conditions Significantly Affecting Care: History of atrial fibrillation    External Records Reviewed: I reviewed recent and relevant outside records including: Outpatient medication reconciliation    Independent Interpretation of Studies:  I independently interpreted: Chest x-ray demonstrates normal cardiac silhouette, no effusions, no infiltrates    Escalation of Care:  Appropriate for outpatient management after discussion with    Social Determinants of Health Significantly Affecting Care:  No social determinants    Prescription Drug Consideration: Metoprolol    Diagnostic testing considered: Noncontributory    Discussion of Management with Other Providers:   I discussed the patient/results with: Cardiology who agrees with plan of care          Procedure  Procedures     Smith Joel MD  01/12/24 7106

## 2024-01-12 NOTE — ED NOTES
discharge instructions given and reviewed. The pt verbalized understanding, no questions at this time. Pt will follow up with her cardiologist and return for any new or worsening s/s/. IV heplock discontinued with cath intact, drsg applied. Wheel chair ride to the exit per medic student.      Anjelica Nichols RN  01/12/24 9023

## 2024-01-15 ENCOUNTER — ANTICOAGULATION - WARFARIN VISIT (OUTPATIENT)
Dept: PHARMACY | Facility: CLINIC | Age: 69
End: 2024-01-15
Payer: MEDICARE

## 2024-01-15 ENCOUNTER — TELEPHONE (OUTPATIENT)
Dept: CARDIOLOGY | Facility: CLINIC | Age: 69
End: 2024-01-15

## 2024-01-15 ENCOUNTER — CLINICAL SUPPORT (OUTPATIENT)
Dept: PHARMACY | Facility: CLINIC | Age: 69
End: 2024-01-15
Payer: MEDICARE

## 2024-01-15 DIAGNOSIS — I48.91 ATRIAL FIBRILLATION, UNSPECIFIED TYPE (MULTI): Primary | ICD-10-CM

## 2024-01-15 LAB
POC INR: 3.3
POC PROTHROMBIN TIME: NORMAL

## 2024-01-15 PROCEDURE — 99212 OFFICE O/P EST SF 10 MIN: CPT

## 2024-01-15 PROCEDURE — 85610 PROTHROMBIN TIME: CPT | Mod: QW

## 2024-01-15 NOTE — PROGRESS NOTES
No bleeding or unusual bruising.  Medications and doses verified.  Pt was in a-fib from Fri 1/12/24 to Sun 1/14/24. Was in ER on Fri 1/12/24 inr was 2.6  Pt is having a cryoablation on 1/18/24 with Dr Ramicone.  INR=3.3   Plan: Half dose today then continue same weekly dose.  Follow up INR check 1 week after procedure.

## 2024-01-15 NOTE — TELEPHONE ENCOUNTER
Pt called to report that her INR today is 3.3, she was inquiring about coumadin dosing in prep for 1/18 ablation.    Reviewed with Dr Ramicone, he would like her to hold today's dose (1/15), take 2mg on Tuesday 1/16 and hold on Wed, 1/17. Procedure is Thursday, 1/18.    Pt notified, voiced understanding.

## 2024-01-19 DIAGNOSIS — I48.0 PAROXYSMAL ATRIAL FIBRILLATION (MULTI): ICD-10-CM

## 2024-01-22 ENCOUNTER — TELEPHONE (OUTPATIENT)
Dept: CARDIOLOGY | Facility: CLINIC | Age: 69
End: 2024-01-22

## 2024-01-22 ENCOUNTER — ANTICOAGULATION - WARFARIN VISIT (OUTPATIENT)
Dept: PHARMACY | Facility: CLINIC | Age: 69
End: 2024-01-22
Payer: MEDICARE

## 2024-01-22 ENCOUNTER — CLINICAL SUPPORT (OUTPATIENT)
Dept: PHARMACY | Facility: CLINIC | Age: 69
End: 2024-01-22
Payer: MEDICARE

## 2024-01-22 DIAGNOSIS — I48.91 ATRIAL FIBRILLATION, UNSPECIFIED TYPE (MULTI): Primary | ICD-10-CM

## 2024-01-22 LAB
POC INR: 2.4
POC PROTHROMBIN TIME: NORMAL

## 2024-01-22 PROCEDURE — 99212 OFFICE O/P EST SF 10 MIN: CPT | Performed by: PHARMACIST

## 2024-01-22 PROCEDURE — 85610 PROTHROMBIN TIME: CPT | Mod: QW | Performed by: PHARMACIST

## 2024-01-22 NOTE — TELEPHONE ENCOUNTER
Pt called this am stating that her INR today is 2.4 and she is asking about coumadin dosing. She is scheduled for ablation tomorrow.     Notified Dr Ramicone, he would like Savita to hold coumadin today and tomorrow. I called pt and notified her .

## 2024-01-22 NOTE — PROGRESS NOTES
Pt having an ablation tomorrow.  Doc told her needs inr around 2 for procedure.      Verified current dose with pt - she was told to take 4 mg on Sat because she held 3 doses last week for the ablation so she was low.     No new meds or med changes since last visit.  Pt denies unusual bleed/bruise.  No upcoming procedures.  Inr = 2.4  Continue same dose and check again in 4 weeks.

## 2024-01-23 ENCOUNTER — ANESTHESIA EVENT (OUTPATIENT)
Dept: CARDIOLOGY | Facility: HOSPITAL | Age: 69
End: 2024-01-23
Payer: MEDICARE

## 2024-01-23 ENCOUNTER — HOSPITAL ENCOUNTER (OUTPATIENT)
Facility: HOSPITAL | Age: 69
Setting detail: OUTPATIENT SURGERY
Discharge: HOME | End: 2024-01-23
Attending: INTERNAL MEDICINE | Admitting: INTERNAL MEDICINE
Payer: MEDICARE

## 2024-01-23 ENCOUNTER — ANESTHESIA (OUTPATIENT)
Dept: CARDIOLOGY | Facility: HOSPITAL | Age: 69
End: 2024-01-23
Payer: MEDICARE

## 2024-01-23 VITALS
RESPIRATION RATE: 16 BRPM | TEMPERATURE: 97.7 F | HEART RATE: 69 BPM | WEIGHT: 187.83 LBS | SYSTOLIC BLOOD PRESSURE: 135 MMHG | HEIGHT: 66 IN | OXYGEN SATURATION: 95 % | DIASTOLIC BLOOD PRESSURE: 72 MMHG | BODY MASS INDEX: 30.19 KG/M2

## 2024-01-23 DIAGNOSIS — I48.0 PAROXYSMAL ATRIAL FIBRILLATION (MULTI): ICD-10-CM

## 2024-01-23 PROBLEM — Z72.0 TOBACCO ABUSE: Status: ACTIVE | Noted: 2024-01-23

## 2024-01-23 PROCEDURE — 2720000007 HC OR 272 NO HCPCS: Performed by: INTERNAL MEDICINE

## 2024-01-23 PROCEDURE — G0269 OCCLUSIVE DEVICE IN VEIN ART: HCPCS | Mod: TC | Performed by: INTERNAL MEDICINE

## 2024-01-23 PROCEDURE — 99221 1ST HOSP IP/OBS SF/LOW 40: CPT | Performed by: NURSE PRACTITIONER

## 2024-01-23 PROCEDURE — 7100000009 HC PHASE TWO TIME - INITIAL BASE CHARGE: Performed by: INTERNAL MEDICINE

## 2024-01-23 PROCEDURE — 2500000005 HC RX 250 GENERAL PHARMACY W/O HCPCS: Performed by: ANESTHESIOLOGIST ASSISTANT

## 2024-01-23 PROCEDURE — 2500000004 HC RX 250 GENERAL PHARMACY W/ HCPCS (ALT 636 FOR OP/ED): Performed by: INTERNAL MEDICINE

## 2024-01-23 PROCEDURE — C1760 CLOSURE DEV, VASC: HCPCS | Performed by: INTERNAL MEDICINE

## 2024-01-23 PROCEDURE — 2500000005 HC RX 250 GENERAL PHARMACY W/O HCPCS: Performed by: INTERNAL MEDICINE

## 2024-01-23 PROCEDURE — 2780000003 HC OR 278 NO HCPCS: Performed by: INTERNAL MEDICINE

## 2024-01-23 PROCEDURE — 2500000004 HC RX 250 GENERAL PHARMACY W/ HCPCS (ALT 636 FOR OP/ED): Performed by: NURSE PRACTITIONER

## 2024-01-23 PROCEDURE — A93656 PR EPHYS EVL TRNSPTL TX ATRIAL FIB ISOLAT PULM VEIN: Performed by: ANESTHESIOLOGY

## 2024-01-23 PROCEDURE — 7100000010 HC PHASE TWO TIME - EACH INCREMENTAL 1 MINUTE: Performed by: INTERNAL MEDICINE

## 2024-01-23 PROCEDURE — 3700000002 HC GENERAL ANESTHESIA TIME - EACH INCREMENTAL 1 MINUTE: Performed by: INTERNAL MEDICINE

## 2024-01-23 PROCEDURE — C1769 GUIDE WIRE: HCPCS | Performed by: INTERNAL MEDICINE

## 2024-01-23 PROCEDURE — C1730 CATH, EP, 19 OR FEW ELECT: HCPCS | Performed by: INTERNAL MEDICINE

## 2024-01-23 PROCEDURE — A93656 PR EPHYS EVL TRNSPTL TX ATRIAL FIB ISOLAT PULM VEIN: Performed by: ANESTHESIOLOGIST ASSISTANT

## 2024-01-23 PROCEDURE — C1759 CATH, INTRA ECHOCARDIOGRAPHY: HCPCS | Performed by: INTERNAL MEDICINE

## 2024-01-23 PROCEDURE — 3700000001 HC GENERAL ANESTHESIA TIME - INITIAL BASE CHARGE: Performed by: INTERNAL MEDICINE

## 2024-01-23 PROCEDURE — C1733 CATH, EP, OTHR THAN COOL-TIP: HCPCS | Performed by: INTERNAL MEDICINE

## 2024-01-23 PROCEDURE — 93656 COMPRE EP EVAL ABLTJ ATR FIB: CPT | Performed by: INTERNAL MEDICINE

## 2024-01-23 PROCEDURE — C1766 INTRO/SHEATH,STRBLE,NON-PEEL: HCPCS | Performed by: INTERNAL MEDICINE

## 2024-01-23 PROCEDURE — 2550000001 HC RX 255 CONTRASTS: Performed by: INTERNAL MEDICINE

## 2024-01-23 PROCEDURE — 2500000004 HC RX 250 GENERAL PHARMACY W/ HCPCS (ALT 636 FOR OP/ED): Performed by: ANESTHESIOLOGIST ASSISTANT

## 2024-01-23 PROCEDURE — C1751 CATH, INF, PER/CENT/MIDLINE: HCPCS | Performed by: INTERNAL MEDICINE

## 2024-01-23 PROCEDURE — 85347 COAGULATION TIME ACTIVATED: CPT

## 2024-01-23 RX ORDER — PHENYLEPHRINE 10 MG/250 ML(40 MCG/ML)IN 0.9 % SOD.CHLORIDE INTRAVENOUS
CONTINUOUS PRN
Status: DISCONTINUED | OUTPATIENT
Start: 2024-01-23 | End: 2024-01-23

## 2024-01-23 RX ORDER — IPRATROPIUM BROMIDE 0.5 MG/2.5ML
500 SOLUTION RESPIRATORY (INHALATION) ONCE
Status: DISCONTINUED | OUTPATIENT
Start: 2024-01-23 | End: 2024-01-23

## 2024-01-23 RX ORDER — PHENYLEPHRINE HCL IN 0.9% NACL 1 MG/10 ML
SYRINGE (ML) INTRAVENOUS AS NEEDED
Status: DISCONTINUED | OUTPATIENT
Start: 2024-01-23 | End: 2024-01-23

## 2024-01-23 RX ORDER — MIDAZOLAM HYDROCHLORIDE 1 MG/ML
INJECTION, SOLUTION INTRAMUSCULAR; INTRAVENOUS AS NEEDED
Status: DISCONTINUED | OUTPATIENT
Start: 2024-01-23 | End: 2024-01-23

## 2024-01-23 RX ORDER — TRAMADOL HYDROCHLORIDE 50 MG/1
50 TABLET ORAL EVERY 8 HOURS PRN
Status: DISCONTINUED | OUTPATIENT
Start: 2024-01-23 | End: 2024-01-23 | Stop reason: HOSPADM

## 2024-01-23 RX ORDER — PANTOPRAZOLE SODIUM 40 MG/1
40 TABLET, DELAYED RELEASE ORAL DAILY
Qty: 30 TABLET | Refills: 0 | Status: SHIPPED | OUTPATIENT
Start: 2024-01-23 | End: 2024-02-22

## 2024-01-23 RX ORDER — ONDANSETRON HYDROCHLORIDE 2 MG/ML
4 INJECTION, SOLUTION INTRAVENOUS ONCE AS NEEDED
Status: DISCONTINUED | OUTPATIENT
Start: 2024-01-23 | End: 2024-01-23

## 2024-01-23 RX ORDER — ALBUTEROL SULFATE 0.83 MG/ML
2.5 SOLUTION RESPIRATORY (INHALATION) ONCE AS NEEDED
Status: DISCONTINUED | OUTPATIENT
Start: 2024-01-23 | End: 2024-01-23

## 2024-01-23 RX ORDER — ROCURONIUM BROMIDE 10 MG/ML
INJECTION, SOLUTION INTRAVENOUS AS NEEDED
Status: DISCONTINUED | OUTPATIENT
Start: 2024-01-23 | End: 2024-01-23

## 2024-01-23 RX ORDER — NORETHINDRONE AND ETHINYL ESTRADIOL 0.5-0.035
KIT ORAL AS NEEDED
Status: DISCONTINUED | OUTPATIENT
Start: 2024-01-23 | End: 2024-01-23

## 2024-01-23 RX ORDER — KETOROLAC TROMETHAMINE 15 MG/ML
15 INJECTION, SOLUTION INTRAMUSCULAR; INTRAVENOUS ONCE AS NEEDED
Status: DISCONTINUED | OUTPATIENT
Start: 2024-01-23 | End: 2024-01-23

## 2024-01-23 RX ORDER — ACETAMINOPHEN 325 MG/1
650 TABLET ORAL EVERY 4 HOURS PRN
Status: DISCONTINUED | OUTPATIENT
Start: 2024-01-23 | End: 2024-01-23 | Stop reason: HOSPADM

## 2024-01-23 RX ORDER — PROPOFOL 10 MG/ML
INJECTION, EMULSION INTRAVENOUS AS NEEDED
Status: DISCONTINUED | OUTPATIENT
Start: 2024-01-23 | End: 2024-01-23

## 2024-01-23 RX ORDER — DEXAMETHASONE SODIUM PHOSPHATE 4 MG/ML
INJECTION, SOLUTION INTRA-ARTICULAR; INTRALESIONAL; INTRAMUSCULAR; INTRAVENOUS; SOFT TISSUE AS NEEDED
Status: DISCONTINUED | OUTPATIENT
Start: 2024-01-23 | End: 2024-01-23

## 2024-01-23 RX ORDER — ACETAMINOPHEN 325 MG/1
650 TABLET ORAL EVERY 4 HOURS PRN
Status: DISCONTINUED | OUTPATIENT
Start: 2024-01-23 | End: 2024-01-23

## 2024-01-23 RX ORDER — SUCCINYLCHOLINE CHLORIDE 20 MG/ML
INJECTION INTRAMUSCULAR; INTRAVENOUS AS NEEDED
Status: DISCONTINUED | OUTPATIENT
Start: 2024-01-23 | End: 2024-01-23

## 2024-01-23 RX ORDER — ONDANSETRON HYDROCHLORIDE 2 MG/ML
INJECTION, SOLUTION INTRAVENOUS AS NEEDED
Status: DISCONTINUED | OUTPATIENT
Start: 2024-01-23 | End: 2024-01-23

## 2024-01-23 RX ORDER — PROTAMINE SULFATE 10 MG/ML
INJECTION, SOLUTION INTRAVENOUS AS NEEDED
Status: DISCONTINUED | OUTPATIENT
Start: 2024-01-23 | End: 2024-01-23

## 2024-01-23 RX ORDER — HEPARIN SODIUM 10000 [USP'U]/100ML
0-4500 INJECTION, SOLUTION INTRAVENOUS CONTINUOUS
Status: DISCONTINUED | OUTPATIENT
Start: 2024-01-23 | End: 2024-01-23

## 2024-01-23 RX ORDER — LIDOCAINE HYDROCHLORIDE 20 MG/ML
INJECTION, SOLUTION INFILTRATION; PERINEURAL AS NEEDED
Status: DISCONTINUED | OUTPATIENT
Start: 2024-01-23 | End: 2024-01-23 | Stop reason: HOSPADM

## 2024-01-23 RX ORDER — SODIUM CHLORIDE 9 MG/ML
INJECTION, SOLUTION INTRAVENOUS CONTINUOUS PRN
Status: DISCONTINUED | OUTPATIENT
Start: 2024-01-23 | End: 2024-01-23 | Stop reason: HOSPADM

## 2024-01-23 RX ORDER — LIDOCAINE HCL/PF 100 MG/5ML
SYRINGE (ML) INTRAVENOUS AS NEEDED
Status: DISCONTINUED | OUTPATIENT
Start: 2024-01-23 | End: 2024-01-23

## 2024-01-23 RX ORDER — LIDOCAINE HYDROCHLORIDE 10 MG/ML
0.1 INJECTION INFILTRATION; PERINEURAL ONCE
Status: DISCONTINUED | OUTPATIENT
Start: 2024-01-23 | End: 2024-01-23

## 2024-01-23 RX ORDER — SODIUM CHLORIDE, SODIUM LACTATE, POTASSIUM CHLORIDE, CALCIUM CHLORIDE 600; 310; 30; 20 MG/100ML; MG/100ML; MG/100ML; MG/100ML
100 INJECTION, SOLUTION INTRAVENOUS CONTINUOUS
Status: DISCONTINUED | OUTPATIENT
Start: 2024-01-23 | End: 2024-01-23 | Stop reason: HOSPADM

## 2024-01-23 RX ORDER — HEPARIN SODIUM 1000 [USP'U]/ML
INJECTION, SOLUTION INTRAVENOUS; SUBCUTANEOUS AS NEEDED
Status: DISCONTINUED | OUTPATIENT
Start: 2024-01-23 | End: 2024-01-23 | Stop reason: HOSPADM

## 2024-01-23 RX ORDER — FENTANYL CITRATE 50 UG/ML
INJECTION, SOLUTION INTRAMUSCULAR; INTRAVENOUS AS NEEDED
Status: DISCONTINUED | OUTPATIENT
Start: 2024-01-23 | End: 2024-01-23

## 2024-01-23 RX ADMIN — PROPOFOL 200 MG: 10 INJECTION, EMULSION INTRAVENOUS at 11:45

## 2024-01-23 RX ADMIN — PROTAMINE SULFATE 50 MG: 10 INJECTION, SOLUTION INTRAVENOUS at 14:27

## 2024-01-23 RX ADMIN — Medication 200 MCG: at 12:03

## 2024-01-23 RX ADMIN — SODIUM CHLORIDE: 9 INJECTION, SOLUTION INTRAVENOUS at 11:21

## 2024-01-23 RX ADMIN — SUCCINYLCHOLINE CHLORIDE 120 MG: 20 INJECTION, SOLUTION INTRAMUSCULAR; INTRAVENOUS at 11:45

## 2024-01-23 RX ADMIN — Medication 200 MCG: at 11:53

## 2024-01-23 RX ADMIN — Medication 200 MCG: at 11:47

## 2024-01-23 RX ADMIN — EPHEDRINE SULFATE 10 MG: 50 INJECTION, SOLUTION INTRAVENOUS at 12:05

## 2024-01-23 RX ADMIN — DEXAMETHASONE SODIUM PHOSPHATE 4 MG: 4 INJECTION, SOLUTION INTRAMUSCULAR; INTRAVENOUS at 14:25

## 2024-01-23 RX ADMIN — EPHEDRINE SULFATE 15 MG: 50 INJECTION, SOLUTION INTRAVENOUS at 12:07

## 2024-01-23 RX ADMIN — LIDOCAINE HYDROCHLORIDE 60 MG: 20 INJECTION, SOLUTION INTRAVENOUS at 11:45

## 2024-01-23 RX ADMIN — HEPARIN SODIUM AND DEXTROSE 1200 UNITS/HR: 10000; 5 INJECTION INTRAVENOUS at 12:40

## 2024-01-23 RX ADMIN — FENTANYL CITRATE 150 MCG: 50 INJECTION, SOLUTION INTRAMUSCULAR; INTRAVENOUS at 13:43

## 2024-01-23 RX ADMIN — MIDAZOLAM 2 MG: 1 INJECTION INTRAMUSCULAR; INTRAVENOUS at 11:40

## 2024-01-23 RX ADMIN — Medication 100 MCG: at 12:31

## 2024-01-23 RX ADMIN — Medication 100 MCG: at 14:35

## 2024-01-23 RX ADMIN — ROCURONIUM BROMIDE 10 MG: 10 INJECTION, SOLUTION INTRAVENOUS at 11:45

## 2024-01-23 RX ADMIN — Medication 300 MCG: at 12:20

## 2024-01-23 RX ADMIN — FENTANYL CITRATE 100 MCG: 50 INJECTION, SOLUTION INTRAMUSCULAR; INTRAVENOUS at 11:45

## 2024-01-23 RX ADMIN — ONDANSETRON 4 MG: 2 INJECTION, SOLUTION INTRAMUSCULAR; INTRAVENOUS at 14:25

## 2024-01-23 RX ADMIN — EPHEDRINE SULFATE 10 MG: 50 INJECTION, SOLUTION INTRAVENOUS at 12:06

## 2024-01-23 RX ADMIN — EPHEDRINE SULFATE 15 MG: 50 INJECTION, SOLUTION INTRAVENOUS at 12:12

## 2024-01-23 RX ADMIN — Medication 0.6 MCG/KG/MIN: at 12:31

## 2024-01-23 SDOH — HEALTH STABILITY: MENTAL HEALTH: CURRENT SMOKER: 1

## 2024-01-23 ASSESSMENT — ENCOUNTER SYMPTOMS
PALPITATIONS: 1
FATIGUE: 1
SHORTNESS OF BREATH: 1
LIGHT-HEADEDNESS: 1

## 2024-01-23 ASSESSMENT — PAIN - FUNCTIONAL ASSESSMENT
PAIN_FUNCTIONAL_ASSESSMENT: 0-10

## 2024-01-23 ASSESSMENT — COLUMBIA-SUICIDE SEVERITY RATING SCALE - C-SSRS
6. HAVE YOU EVER DONE ANYTHING, STARTED TO DO ANYTHING, OR PREPARED TO DO ANYTHING TO END YOUR LIFE?: NO
2. HAVE YOU ACTUALLY HAD ANY THOUGHTS OF KILLING YOURSELF?: NO
1. IN THE PAST MONTH, HAVE YOU WISHED YOU WERE DEAD OR WISHED YOU COULD GO TO SLEEP AND NOT WAKE UP?: NO

## 2024-01-23 ASSESSMENT — PAIN SCALES - GENERAL
PAINLEVEL_OUTOF10: 0 - NO PAIN
PAIN_LEVEL: 0
PAINLEVEL_OUTOF10: 0 - NO PAIN
PAINLEVEL_OUTOF10: 0 - NO PAIN

## 2024-01-23 NOTE — ANESTHESIA PREPROCEDURE EVALUATION
Patient: Savita Buchanan    Procedure Information       Date/Time: 01/23/24 1130    Procedure: ABLATION A-FIB CRYO CPT 43515 - 1:00 pm, CPT 21258, move the device upgrade to thursday at 3    Location: [facility] EP [facility] / [facility]    Providers: James C Ramicone, DO            Relevant Problems   Cardiovascular   (+) Hyperlipidemia   (+) Hypertension, benign   (+) Paroxysmal atrial fibrillation (CMS/HCC)      Pulmonary   (+) Obstructive sleep apnea      Musculoskeletal   (+) Displacement of lumbar intervertebral disc without myelopathy      Infectious Disease   (+) Acute bacterial sinusitis       Clinical information reviewed:      Problems              NPO Detail:  No data recorded     Physical Exam    Airway  Mallampati: IV  TM distance: <3 FB  Neck ROM: full     Cardiovascular - normal exam  Rhythm: regular  Rate: normal     Dental - normal exam     Pulmonary - normal exam     Abdominal            Anesthesia Plan    History of general anesthesia?: yes  History of complications of general anesthesia?: no    ASA 3     general   (A-LINE PLACEMENT)  The patient is a current smoker.  Patient was previously instructed to abstain from smoking on day of procedure.  Patient did not smoke on day of procedure.  Education provided regarding risk of obstructive sleep apnea.  intravenous induction   Postoperative administration of opioids is intended.  Trial extubation is planned.  Anesthetic plan and risks discussed with patient.  Use of blood products discussed with patient who consented to blood products.    Plan discussed with [facility]AA.

## 2024-01-23 NOTE — ANESTHESIA POSTPROCEDURE EVALUATION
Patient: Savita Buchanan    Procedure Summary       Date: 01/23/24 Room / Location: [facility] / [facility]    Anesthesia Start: 1129 Anesthesia Stop: 1504    Procedure: ABLATION A-FIB CRYO CPT 47357 Diagnosis:       Paroxysmal atrial fibrillation (CMS/HCC)      (Paroxysmal atrial fibrillation (CMS/HCC) [I48.0])    Providers: James C Ramicone, DO Responsible Provider: Wilver Moran MD    Anesthesia Type: general ASA Status: 3            Anesthesia Type: general    Vitals Value Taken Time   /74 01/23/24 1501   Temp 36.5 °C (97.7 °F) 01/23/24 1501   Pulse 72 01/23/24 1501   Resp 16 01/23/24 1501   SpO2 81 % 01/23/24 1501       Anesthesia Post Evaluation    Patient location during evaluation: PACU  Patient participation: complete - patient participated  Level of consciousness: sleepy but conscious  Pain score: 0  Pain management: adequate  Airway patency: patent  Cardiovascular status: acceptable and hemodynamically stable  Respiratory status: face mask  Hydration status: acceptable  Postoperative Nausea and Vomiting: none        No notable events documented.

## 2024-01-23 NOTE — DISCHARGE INSTRUCTIONS
Patient Instructions:  - No driving, alcohol or making legal decisions for 24 hours.  - Remove band-aid/tegaderm in 1 day.  - No heavy lifting, strenuous exercise for 1 week  - Please call our office if you notice any groin discharge or swelling or fever.   - Resume warfarin (coumadin) this evening, 1/23/24.    - Start Protonix daily for 30 days.  - Follow up tomorrow for incision check at Dr. Ramicone's office.  - Keep bilateral groin sites clean and dry, cleanse gently with soap and water, may cover with simple bandage.  No lotions or powder to bilateral groin sites for one week.

## 2024-01-23 NOTE — ANESTHESIA PROCEDURE NOTES
Airway  Date/Time: 1/23/2024 11:47 AM  Urgency: elective    Airway not difficult    Staffing  Performed: MARIE   Authorized by: Wilver Moran MD    Performed by: MARIE Chao  Patient location during procedure: OR    Indications and Patient Condition  Indications for airway management: anesthesia  Spontaneous Ventilation: absent  Sedation level: deep  Preoxygenated: yes  Patient position: sniffing  MILS maintained throughout  Mask difficulty assessment: 1 - vent by mask  Planned trial extubation    Final Airway Details  Final airway type: endotracheal airway      Successful airway: ETT  Cuffed: yes   Successful intubation technique: video laryngoscopy  Facilitating devices/methods: intubating stylet  Endotracheal tube insertion site: oral  Blade: Matt  Blade size: #3  ETT size (mm): 7.0  Cormack-Lehane Classification: grade IIa - partial view of glottis  Placement verified by: chest auscultation and capnometry   Measured from: lips  ETT to lips (cm): 21  Number of attempts at approach: 1  Number of other approaches attempted: 0

## 2024-01-23 NOTE — ANESTHESIA PROCEDURE NOTES
Arterial Line:    Date/Time: 1/23/2024 11:58 AM    Staffing  Performed: attending   Authorized by: Wilver Moran MD    Performed by: MARIE Chao    An arterial line was placed. Procedure performed using ultrasound guidance.in the OR for the following indication(s): continuous blood pressure monitoring and blood sampling needed.    A 20 gauge (size) (length), Arrow (type) catheter was placed into the Right radial artery, secured by Tegaderm,   Seldinger technique used.  Events:  patient tolerated procedure well with no complications.

## 2024-01-23 NOTE — H&P
History and Physical   Pre Surgical Review (> 30 days)      Subjective   This is a 68 y.o. female with a PMH significant for LAURI, HTN and atrial fibrillation.  The patient has been on flecainide for atrial fibrillation but recently has been having more frequent symptomatic episodes of atrial fibrillation despite flecainide, one requiring ED visit in October 2023. The presents to [facility] today, 1/23/2024 for pulmonary vein isolation procedure with Dr. Ramicone.     PMH:  LAURI, HTN, atrial fibrillation, HLD, neuropathy  PSH:  back surgery  Family history:  Mother-aortic aneurysm.  Father-cardiac disease.  Social history:  Current smoker, denies alcohol and illicit drug use      Review of Systems  Review of Systems   Constitutional:  Positive for fatigue.        With episodes of atrial fibrillation.   Respiratory:  Positive for shortness of breath.         With episodes of atrial fibrillation.   Cardiovascular:  Positive for chest pain and palpitations. Negative for leg swelling.        With episodes of atrial fibrillation.   Neurological:  Positive for light-headedness.        With episodes of atrial fibrillation.         Objective      Allergies  Allergies   Allergen Reactions    Penicillins Hives and Shortness of breath    Latex Hives    Phenobarbital Hives    Sulfa (Sulfonamide Antibiotics) Hives    Neomycin-Bacitracin-Polymyxin Rash       Home Medications  Current Outpatient Medications   Medication Instructions    ascorbic acid (Vitamin C) 500 mg tablet 1 tablet, oral, Daily    cholecalciferol, vitamin D3, 250 mcg (10,000 unit) capsule 1 capsule, oral, Daily    cyanocobalamin (Vitamin B-12) 100 mcg tablet 1 tablet, oral, Daily    flecainide (TAMBOCOR) 150 mg, oral, 2 times daily    gabapentin (NEURONTIN) 300 mg, oral, 2 times daily    Lactobacillus acidophilus (PROBIOTIC ORAL) 1 tablet, oral, Daily    lisinopriL-hydrochlorothiazide 20-12.5 mg tablet 1 tablet, oral, Daily    lisinopril 20 mg, oral, Daily     magnesium oxide 500 mg tablet 1 tablet, oral, Daily    melatonin 5 mg, oral, Nightly    metoprolol succinate XL (TOPROL-XL) 50 mg, oral, Daily, Do not crush or chew.    multivitamin tablet 1 tablet, oral, Daily    potassium chloride CR (Klor-Con M20) 20 mEq ER tablet 1 tablet, oral, Every 12 hours    simvastatin (Zocor) 10 mg tablet TAKE ONE TABLET BY MOUTH EVERY DAY    warfarin (COUMADIN) 2 mg, oral, 5 times weekly, On Tuesday, Wednesday, Thursday, Saturday, and Sunday    warfarin (JANTOVEN) 4 mg, oral, 2 times weekly, On Monday and Friday    zinc sulfate (Zincate) 220 (50 Zn) MG capsule 1 capsule, oral, Daily        Physical Exam  Physical Exam  Constitutional:       General: She is not in acute distress.  Cardiovascular:      Rate and Rhythm: Normal rate and regular rhythm.      Pulses: Normal pulses.   Pulmonary:      Effort: Pulmonary effort is normal. No respiratory distress.      Comments: Clear bilaterally.  Abdominal:      General: Bowel sounds are normal.   Skin:     General: Skin is warm and dry.   Neurological:      General: No focal deficit present.      Mental Status: She is alert and oriented to person, place, and time.   Psychiatric:         Mood and Affect: Mood normal.         Behavior: Behavior normal.        Airway/Sedation Assessment     Assessment by anesthesia See anesthesia airway/sedation assessment     ·  Mouth Opening OK See anesthesia airway/sedation assessment      Neck Flexibility OK See anesthesia airway/sedation assessment      Loose Teeth See anesthesia airway/sedation assessment   ·  Oropharyngeal Classification See anesthesia airway/sedation assessment   ·  ASA PS Classification ASA II - Patient with mild systemic disease      Sedation Plan Moderate     Risks, benefits, and alternatives discussed with patient.     ERAS (Enhanced Recovery After Surgery):  ·  ERAS Patient: No      Consent:   COVID-19 Consent:  ·  COVID-19 Risk Consent Surgeon has reviewed key risks related to the  risk of robert COVID-19 and if they contract COVID-19 what the risks are.       Juana Chandler, APRN-CNP

## 2024-01-23 NOTE — NURSING NOTE
Patient on bedpan to void.       1730  Dr. Ramicone at bedside to speak with patient and family.  HOB elevated.    1755  Patient ambulated to bathroom.  Groins stable.  Discharge given to patient and family.

## 2024-01-23 NOTE — Clinical Note
Accessed site: left femoral vein.   Ultrasound guidance was used. 4 Bahamian micropuncture. Wire advanced followed by 4 Bahamian sheath, second 0.035 J wire advanced and sheath removed
Accessed site: left femoral vein.   Ultrasound guidance was used. 4 [place] micropuncture. Wire advanced followed by 4 [place] sheath, 0.035 J wire advanced and sheath removed
Accessed site: right femoral vein.   Ultrasound guidance was used. 4 Cuban micropuncture. Wire advanced followed by 4 Cuban sheath, 0.035 J wire advanced and sheath removed
Bilateral figure of 8 sutures placed
Closure device placed in the left femoral vein. Site closed by Tegaderm.
Closure device placed in the right femoral vein. Site closed by Tegaderm.
Grounding pad, fast patches and Ensite patches removed, skin intact
Manisha ellison
Patient Clipped and Prepped: groin. Prepped with ChloraPrep, a minimum of 3 minute dry time, longer if needed, no pooling noted, patient draped in sterile fashion.
Patient ID band present and verified. Patient contact is in waiting room. Contact(s) present: spouse.
Placed left back, Ensite patches placed, Fast patches placed AP, skin intact
Pulmonary Veins checked for isolation
Report was give by Frankie POTTER to Maricruz POTTER in PACU.
Sheath was exchanged in the left femoral vein with CLOSURE SYSTEM, VASCULAR, MVP 6-12FR, VENOUS.
Sheath was exchanged in the right femoral vein with CLOSURE SYSTEM, VASCULAR, MVP 6-12FR, VENOUS.
Sheath was exchanged with INTRODUCER, CATHETER, HEMOSTASIS, FAST-CATH, 12 FR X 12 CM.
Sheath was exchanged with STEERABLE SHEATH, FLEXCATH, 12FR. With flex cath cross
Sheath was inserted in the left femoral vein.
Sheath was inserted in the left femoral vein.
Sheath was inserted in the right femoral vein.
Sheath was removed in the left femoral vein. Site closed by figure 8 suture.
The AT pulses are 1+ bilaterally.
The ECG shows a sinus rhythm.
The ECG shows the rhythm is unchanged.
The PT pulses are 1+ bilaterally.
There were no immediate complications during the procedure.
Toray transseptal wire advanced to RA
Toray wire removed
Transseptal puncture performed. 
Universal procedure checklist and airway assessment completed.
advanced
advanced
dry, intact, no bleeding and no hematoma. Bilateral groin sites
removed
WDL

## 2024-01-24 ENCOUNTER — CLINICAL SUPPORT (OUTPATIENT)
Dept: CARDIOLOGY | Facility: CLINIC | Age: 69
End: 2024-01-24
Payer: MEDICARE

## 2024-01-24 NOTE — PROGRESS NOTES
"Patient arrives ambulatory from home by self presenting for suture removal  following cryoablation with Dr. Ramicone yesterday. Patient stated still feeling \"foggy\" from anesthesia, no other complaints offered. Bilateral groin bandages removed, bilateral groin sutures removed without difficulty. Bilateral groins soft, nontender, small amt of bruising, no edema, no drainage/bleeding. New bandages placed and patient instructed ok to remove Fri am, can remove sooner if patient develops skin rash d/t adhesive sensitivity. Reinforced activity restrictions with patient. Understanding verbalized. Patient given follow up appts. All questions/concerns addressed.   "

## 2024-01-25 ENCOUNTER — APPOINTMENT (OUTPATIENT)
Dept: PHARMACY | Facility: CLINIC | Age: 69
End: 2024-01-25
Payer: MEDICARE

## 2024-01-26 LAB
ATRIAL RATE: 113 BPM
P AXIS: 116 DEGREES
PR INTERVAL: 140 MS
Q ONSET: 251 MS
QRS COUNT: 15 BEATS
QRS DURATION: 104 MS
QT INTERVAL: 390 MS
QTC CALCULATION(BAZETT): 521 MS
QTC FREDERICIA: 473 MS
R AXIS: -27 DEGREES
T AXIS: 59 DEGREES
T OFFSET: 446 MS
VENTRICULAR RATE: 107 BPM

## 2024-02-02 DIAGNOSIS — I10 HYPERTENSION, BENIGN: ICD-10-CM

## 2024-02-02 RX ORDER — LISINOPRIL AND HYDROCHLOROTHIAZIDE 12.5; 2 MG/1; MG/1
1 TABLET ORAL DAILY
Qty: 90 TABLET | Refills: 0 | Status: SHIPPED | OUTPATIENT
Start: 2024-02-02 | End: 2024-04-25 | Stop reason: SDUPTHER

## 2024-02-09 LAB
ACT BLD: 312 SEC (ref 89–169)
ACT BLD: 339 SEC (ref 89–169)
ACT BLD: 370 SEC (ref 89–169)
ACT BLD: 382 SEC (ref 89–169)

## 2024-02-22 ENCOUNTER — ANTICOAGULATION - WARFARIN VISIT (OUTPATIENT)
Dept: PHARMACY | Facility: CLINIC | Age: 69
End: 2024-02-22
Payer: MEDICARE

## 2024-02-22 DIAGNOSIS — I48.91 ATRIAL FIBRILLATION, UNSPECIFIED TYPE (MULTI): Primary | ICD-10-CM

## 2024-02-22 DIAGNOSIS — I48.0 PAROXYSMAL ATRIAL FIBRILLATION (MULTI): ICD-10-CM

## 2024-02-22 PROBLEM — E66.9 CLASS 1 OBESITY WITH BODY MASS INDEX (BMI) OF 30.0 TO 30.9 IN ADULT: Status: ACTIVE | Noted: 2024-02-22

## 2024-02-22 PROBLEM — E66.811 CLASS 1 OBESITY WITH BODY MASS INDEX (BMI) OF 30.0 TO 30.9 IN ADULT: Status: ACTIVE | Noted: 2024-02-22

## 2024-02-22 PROBLEM — R53.83 FATIGUE: Status: ACTIVE | Noted: 2024-02-22

## 2024-02-22 PROBLEM — E66.3 OVERWEIGHT (BMI 25.0-29.9): Status: RESOLVED | Noted: 2023-03-24 | Resolved: 2024-02-22

## 2024-02-22 LAB
POC INR: 3.8
POC PROTHROMBIN TIME: NORMAL

## 2024-02-22 PROCEDURE — 99212 OFFICE O/P EST SF 10 MIN: CPT | Performed by: PHARMACIST

## 2024-02-22 PROCEDURE — 85610 PROTHROMBIN TIME: CPT | Mod: QW | Performed by: PHARMACIST

## 2024-02-22 NOTE — PROGRESS NOTES
Coumadin Clinic Visit Note    Patient presents today for anticoagulation monitoring and adjustment.  Patient verified warfarin dosing schedule  Patient denies missing any doses  Patient denies unusual bruising or bleeding  Patient denies changes to medications, alcohol or tobacco use.  Not eating dietary green as much.  There are no anticipated procedures at this time  INR Supratherapeutic today at 3.8  Hold one dose today.  Next appointment 4 weeks      Yamilex Vickers, PharmD           Right arm;

## 2024-02-27 ENCOUNTER — OFFICE VISIT (OUTPATIENT)
Dept: CARDIOLOGY | Facility: CLINIC | Age: 69
End: 2024-02-27
Payer: MEDICARE

## 2024-02-27 VITALS
BODY MASS INDEX: 30.37 KG/M2 | DIASTOLIC BLOOD PRESSURE: 56 MMHG | HEIGHT: 66 IN | WEIGHT: 189 LBS | HEART RATE: 66 BPM | SYSTOLIC BLOOD PRESSURE: 142 MMHG

## 2024-02-27 DIAGNOSIS — I48.20 CHRONIC ATRIAL FIBRILLATION (MULTI): Primary | ICD-10-CM

## 2024-02-27 DIAGNOSIS — I08.0 MITRAL VALVE STENOSIS AND AORTIC VALVE STENOSIS: ICD-10-CM

## 2024-02-27 PROCEDURE — 1126F AMNT PAIN NOTED NONE PRSNT: CPT | Performed by: NURSE PRACTITIONER

## 2024-02-27 PROCEDURE — 3078F DIAST BP <80 MM HG: CPT | Performed by: NURSE PRACTITIONER

## 2024-02-27 PROCEDURE — 1157F ADVNC CARE PLAN IN RCRD: CPT | Performed by: NURSE PRACTITIONER

## 2024-02-27 PROCEDURE — 1159F MED LIST DOCD IN RCRD: CPT | Performed by: NURSE PRACTITIONER

## 2024-02-27 PROCEDURE — 99213 OFFICE O/P EST LOW 20 MIN: CPT | Performed by: NURSE PRACTITIONER

## 2024-02-27 PROCEDURE — 3077F SYST BP >= 140 MM HG: CPT | Performed by: NURSE PRACTITIONER

## 2024-02-27 NOTE — PROGRESS NOTES
Savita Buchanan is a 68 y.o. female     History Of Present Illness   Mrs Buchanan is an obese 68 year old female with hypertension, hyperlipidemia, moderate to severe Mitral valve stenosis and s/p ablation for symptomatic A-fib, here following an ablation with Dr Ramicone.  Since her ablation, she states she is feeling much better.  She denies any further dizziness, feeling light headed, nausea and difficulty completing ADL's.        Social HX  Social History     Tobacco Use    Smoking status: Every Day     Types: Cigarettes    Smokeless tobacco: Never   Substance Use Topics    Alcohol use: Never    Drug use: Never          Family HX  Family History   Problem Relation Name Age of Onset    Aortic aneurysm Mother      Other (cardiac disorder) Father      Other (cardiac disorder) Sister      Aortic aneurysm Other      Other (cardiac disorder) Other            Review Of Systems   Constitutional: + feeling tired.   Eyes: no eyesight problems.  No vision loss or change in vision  ENT: no hearing loss and no nosebleeds.   Cardiovascular: No intermittent leg claudication,   No chest pain, no tightness or heavy pressure  No shortness of breath,  No palpitations,  No lower extremity edema  The heart rate is regular  Respiratory: no chronic cough and no shortness of breath.   Gastrointestinal: no change in bowel habits and no blood in stools.   Genitourinary: no urinary frequency.   Skin: no skin rashes.   Neurological: No frequent falls.   No dizziness  No weakness  Denies headaches  Psychiatric: no depression and not suicidal.   All other systems have been reviewed and are negative for complaint.        Allergies  Allergies   Allergen Reactions    Penicillins Hives and Shortness of breath    Latex Hives    Phenobarbital Hives    Sulfa (Sulfonamide Antibiotics) Hives    Adhesive Rash    Neomycin-Bacitracin-Polymyxin Rash          Vitals  There were no vitals taken for this visit.        Physical Exam  Constitutional: alert and  in no acute distress.   Eyes: no erythema, swelling or discharge from the eye .   Neck: neck is supple, symmetric, trachea midline, no masses  and no thyromegaly .   Pulmonary: No increased work of breathing or signs of respiratory distress    Lungs clear to auscultation.    Auscultation of the lungs revealed no expiratory wheezing, normal expiratory time and no inspiratory wheezing. no rales or crackles were heard bilaterally. no rhonchi  No friction rub. no wheezing.   No diminished breath sounds. no bronchial breath sounds.   Cardiovascular: carotid pulses 2+ bilaterally with no bruit    JVP was normal, no thrills ,   Regular rhythm, normal S1 and S2,   +2/6 mitral murmur  Pedal pulses 2+ bilaterally    No edema .   Abdomen: abdomen non-tender, no masses  and no hepatomegaly . No pulsatile mass noted  Skin: skin warm and dry, normal skin turgor .   Psychiatric judgment and insight is normal  and oriented to person, place and time .           Current/Home Meds    Current Outpatient Medications:     ascorbic acid (Vitamin C) 500 mg tablet, Take 1 tablet (500 mg) by mouth once daily., Disp: , Rfl:     cholecalciferol, vitamin D3, 250 mcg (10,000 unit) capsule, Take 1 capsule (250 mcg) by mouth once daily., Disp: , Rfl:     cyanocobalamin (Vitamin B-12) 100 mcg tablet, Take 1 tablet (100 mcg) by mouth once daily., Disp: , Rfl:     flecainide (Tambocor) 100 mg tablet, Take 1.5 tablets (150 mg) by mouth 2 times a day., Disp: 270 tablet, Rfl: 3    gabapentin (Neurontin) 300 mg capsule, Take 1 capsule (300 mg) by mouth 2 times a day., Disp: 180 capsule, Rfl: 1    Lactobacillus acidophilus (PROBIOTIC ORAL), Take 1 tablet by mouth once daily., Disp: , Rfl:     lisinopril 20 mg tablet, Take 1 tablet (20 mg) by mouth once daily., Disp: 90 tablet, Rfl: 2    lisinopriL-hydrochlorothiazide 20-12.5 mg tablet, Take 1 tablet by mouth once daily., Disp: 90 tablet, Rfl: 0    magnesium oxide 500 mg tablet, Take 1 tablet (500 mg) by  mouth once daily., Disp: , Rfl:     melatonin 5 mg tablet, Take 1 tablet (5 mg) by mouth once daily at bedtime., Disp: , Rfl:     metoprolol succinate XL (Toprol-XL) 50 mg 24 hr tablet, Take 1 tablet (50 mg) by mouth once daily. Do not crush or chew., Disp: 90 tablet, Rfl: 0    multivitamin tablet, Take 1 tablet by mouth once daily., Disp: , Rfl:     pantoprazole (ProtoNix) 40 mg EC tablet, Take 1 tablet (40 mg) by mouth once daily. Do not crush, chew, or split., Disp: 30 tablet, Rfl: 0    potassium chloride CR (Klor-Con M20) 20 mEq ER tablet, Take 1 tablet (20 mEq) by mouth every 12 hours., Disp: , Rfl:     simvastatin (Zocor) 10 mg tablet, TAKE ONE TABLET BY MOUTH EVERY DAY, Disp: 90 tablet, Rfl: 0    warfarin (Coumadin) 4 mg tablet, Take 0.5 tablets (2 mg) by mouth 5 times a week. On Tuesday, Wednesday, Thursday, Saturday, and Sunday, Disp: , Rfl:     warfarin (Jantoven) 4 mg tablet, Take 1 tablet (4 mg) by mouth 2 times a week. On Monday and Friday, Disp: , Rfl:     zinc sulfate (Zincate) 220 (50 Zn) MG capsule, Take 1 capsule (50 mg of elemental zinc) by mouth once daily., Disp: , Rfl:        Labs           EKG Findings  EKG: NSR with a 1st degree av block  Low voltage QRS        Cardiac Service Results:  1/24/24  S/P ablation for a-fib with Dr Ramicone    12/11/23 ECHO:  1. Left ventricular systolic function is normal with a 60-65% estimated ejection fraction.   2. Moderate to severe mitral valve regurgitation.    Assessment/Plan    S/P ablation for a-fib with Dr Ramicone,.  EKG today shows NSR with a 1st degree av block.  She feels much improved with no further complaints of dizziness, feeling light headed, nauseated or short of breath.  She is to continue flecainide and warfarin until she is seen by Dr Nur in June.  She has questions regarding her mitral valve that she would like to speak with Dr Nur about.  She can call me sooner for any questions or concerns.

## 2024-02-29 DIAGNOSIS — I48.20 CHRONIC ATRIAL FIBRILLATION (MULTI): Primary | ICD-10-CM

## 2024-03-05 DIAGNOSIS — I25.84 CORONARY ARTERY CALCIFICATION: ICD-10-CM

## 2024-03-05 DIAGNOSIS — I25.10 CORONARY ARTERY CALCIFICATION: ICD-10-CM

## 2024-03-05 RX ORDER — POTASSIUM CHLORIDE 20 MEQ/1
20 TABLET, EXTENDED RELEASE ORAL EVERY 12 HOURS
Qty: 180 TABLET | Refills: 3 | Status: SHIPPED | OUTPATIENT
Start: 2024-03-05 | End: 2025-03-05

## 2024-03-22 ENCOUNTER — ANTICOAGULATION - WARFARIN VISIT (OUTPATIENT)
Dept: PHARMACY | Facility: CLINIC | Age: 69
End: 2024-03-22
Payer: MEDICARE

## 2024-03-22 DIAGNOSIS — I48.20 CHRONIC ATRIAL FIBRILLATION (MULTI): ICD-10-CM

## 2024-03-22 DIAGNOSIS — I48.91 ATRIAL FIBRILLATION, UNSPECIFIED TYPE (MULTI): Primary | ICD-10-CM

## 2024-03-22 LAB
POC INR: 3.6
POC PROTHROMBIN TIME: NORMAL

## 2024-03-22 PROCEDURE — 99212 OFFICE O/P EST SF 10 MIN: CPT | Performed by: PHARMACIST

## 2024-03-22 PROCEDURE — 85610 PROTHROMBIN TIME: CPT | Mod: QW | Performed by: PHARMACIST

## 2024-03-22 NOTE — PROGRESS NOTES
Coumadin Clinic Visit Note    Patient verified warfarin dose  No missed doses  No unusual bruising or bleeding, had sinuses and allergies   No changes to medications  Consistent dietary green intake, no stopped meds , no tylenol , no alcohol , she smokes , same number of cigareetes , no change , no new meds , no change in greens , no Mtv , no boost or ensure , no alcohol , beentrending up for few month so will skip dose today and decrease dose   No anticipated procedures at this time  INR Supratherapeutic today at 3.6,   No changes to warfarin dose today  Next appointment 4 weeks      Meena Blood, PharmD

## 2024-04-02 DIAGNOSIS — I10 HYPERTENSION, BENIGN: ICD-10-CM

## 2024-04-02 DIAGNOSIS — I48.0 PAROXYSMAL ATRIAL FIBRILLATION (MULTI): ICD-10-CM

## 2024-04-02 DIAGNOSIS — E78.00 PURE HYPERCHOLESTEROLEMIA: ICD-10-CM

## 2024-04-02 RX ORDER — METOPROLOL SUCCINATE 50 MG/1
50 TABLET, EXTENDED RELEASE ORAL DAILY
Qty: 90 TABLET | Refills: 3 | Status: SHIPPED | OUTPATIENT
Start: 2024-04-02 | End: 2025-04-02

## 2024-04-02 RX ORDER — SIMVASTATIN 10 MG/1
10 TABLET, FILM COATED ORAL DAILY
Qty: 90 TABLET | Refills: 0 | Status: SHIPPED | OUTPATIENT
Start: 2024-04-02

## 2024-04-18 ENCOUNTER — ANTICOAGULATION - WARFARIN VISIT (OUTPATIENT)
Dept: PHARMACY | Facility: CLINIC | Age: 69
End: 2024-04-18
Payer: MEDICARE

## 2024-04-18 DIAGNOSIS — I48.20 CHRONIC ATRIAL FIBRILLATION (MULTI): Primary | ICD-10-CM

## 2024-04-18 LAB
POC INR: 2.5
POC PROTHROMBIN TIME: NORMAL

## 2024-04-18 PROCEDURE — 99212 OFFICE O/P EST SF 10 MIN: CPT | Performed by: PHARMACIST

## 2024-04-18 PROCEDURE — 85610 PROTHROMBIN TIME: CPT | Mod: QW | Performed by: PHARMACIST

## 2024-04-18 NOTE — PROGRESS NOTES
Verified current dose with pt and that she held 1 dose as instructed.  No new meds or med changes since last visit.  Pt denies unusual bleed/bruise.  No upcoming procedures.  Pt took some tylenol on Monday  Inr = 2.5  Continue same dose and check again in 4 weeks.

## 2024-04-25 ENCOUNTER — TELEPHONE (OUTPATIENT)
Dept: PRIMARY CARE | Facility: CLINIC | Age: 69
End: 2024-04-25
Payer: MEDICARE

## 2024-04-25 DIAGNOSIS — I10 HYPERTENSION, BENIGN: ICD-10-CM

## 2024-04-25 RX ORDER — LISINOPRIL AND HYDROCHLOROTHIAZIDE 12.5; 2 MG/1; MG/1
1 TABLET ORAL DAILY
Qty: 90 TABLET | Refills: 0 | Status: SHIPPED | OUTPATIENT
Start: 2024-04-25

## 2024-04-25 NOTE — TELEPHONE ENCOUNTER
Pt was here in November & needed refill on lisinopril 20-12.5 mg to [facility] -[phone]  Allergy to phenobarbital,sulfa,neomycin  Ty

## 2024-05-16 ENCOUNTER — ANTICOAGULATION - WARFARIN VISIT (OUTPATIENT)
Dept: PHARMACY | Facility: CLINIC | Age: 69
End: 2024-05-16
Payer: MEDICARE

## 2024-05-16 DIAGNOSIS — I48.91 ATRIAL FIBRILLATION, UNSPECIFIED TYPE (MULTI): Primary | ICD-10-CM

## 2024-05-16 DIAGNOSIS — I48.20 CHRONIC ATRIAL FIBRILLATION (MULTI): ICD-10-CM

## 2024-05-16 LAB
POC INR: 2.8
POC PROTHROMBIN TIME: NORMAL

## 2024-05-16 PROCEDURE — 85610 PROTHROMBIN TIME: CPT | Mod: QW | Performed by: PHARMACIST

## 2024-05-16 PROCEDURE — 99212 OFFICE O/P EST SF 10 MIN: CPT | Performed by: PHARMACIST

## 2024-05-16 NOTE — PROGRESS NOTES
Savita Buchanan is a 68 y.o. female with history of atrial fibrillation who presents today for anticoagulation monitoring and adjustment.  INR 2.8 is therapeutic for this patient (goal range 2-3) and is reflective of 16 mg TWD  Patient verifies current dosing regimen, patient able to verbally recall dose  Patient reports 0 missed doses since last INR  Last INR 2.5 on 4/18/24 (4 week interval)  Patient denies s/sx clotting and/or stroke  Patient denies hematuria, epistaxis, rectal bleeding  Patient denies changes in diet, alcohol, or tobacco use  Vegetable intake consistent from week to week  Reviewed medication list and drug allergies with patient, updated any medication additions or modifications accordingly  Acetaminophen intake: no changes   Patient also denies any pending medical or dental procedures scheduled at this time  Patient was instructed to continue with current therapy of warfarin 16mg TWD and RTC 5 weeks    Meg Paul, PharmD, BCPS   5/16/2024 9:40 AM

## 2024-06-17 ENCOUNTER — APPOINTMENT (OUTPATIENT)
Dept: CARDIOLOGY | Facility: CLINIC | Age: 69
End: 2024-06-17
Payer: MEDICARE

## 2024-06-18 DIAGNOSIS — G62.9 NEUROPATHY: ICD-10-CM

## 2024-06-18 DIAGNOSIS — E78.00 PURE HYPERCHOLESTEROLEMIA: ICD-10-CM

## 2024-06-18 RX ORDER — GABAPENTIN 300 MG/1
300 CAPSULE ORAL 2 TIMES DAILY
Qty: 180 CAPSULE | Refills: 1 | Status: SHIPPED | OUTPATIENT
Start: 2024-06-18

## 2024-06-18 RX ORDER — SIMVASTATIN 10 MG/1
10 TABLET, FILM COATED ORAL DAILY
Qty: 90 TABLET | Refills: 0 | Status: SHIPPED | OUTPATIENT
Start: 2024-06-18

## 2024-06-20 ENCOUNTER — ANTICOAGULATION - WARFARIN VISIT (OUTPATIENT)
Dept: PHARMACY | Facility: CLINIC | Age: 69
End: 2024-06-20
Payer: MEDICARE

## 2024-06-20 DIAGNOSIS — I48.20 CHRONIC ATRIAL FIBRILLATION (MULTI): ICD-10-CM

## 2024-06-20 DIAGNOSIS — I48.91 ATRIAL FIBRILLATION, UNSPECIFIED TYPE (MULTI): Primary | ICD-10-CM

## 2024-06-20 LAB
POC INR: 2.4
POC PROTHROMBIN TIME: NORMAL

## 2024-06-20 PROCEDURE — 99212 OFFICE O/P EST SF 10 MIN: CPT

## 2024-06-20 PROCEDURE — 85610 PROTHROMBIN TIME: CPT | Mod: QW

## 2024-07-01 ENCOUNTER — APPOINTMENT (OUTPATIENT)
Dept: CARDIOLOGY | Facility: CLINIC | Age: 69
End: 2024-07-01
Payer: MEDICARE

## 2024-07-01 VITALS
DIASTOLIC BLOOD PRESSURE: 85 MMHG | HEIGHT: 66 IN | BODY MASS INDEX: 29.89 KG/M2 | HEART RATE: 70 BPM | OXYGEN SATURATION: 100 % | WEIGHT: 186 LBS | SYSTOLIC BLOOD PRESSURE: 138 MMHG

## 2024-07-01 DIAGNOSIS — E78.5 HYPERLIPIDEMIA, UNSPECIFIED HYPERLIPIDEMIA TYPE: ICD-10-CM

## 2024-07-01 DIAGNOSIS — I25.84 CORONARY ARTERY CALCIFICATION: Primary | ICD-10-CM

## 2024-07-01 DIAGNOSIS — I34.0 MITRAL VALVE INSUFFICIENCY, UNSPECIFIED ETIOLOGY: ICD-10-CM

## 2024-07-01 DIAGNOSIS — I25.10 CORONARY ARTERY CALCIFICATION: Primary | ICD-10-CM

## 2024-07-01 DIAGNOSIS — E66.9 CLASS 1 OBESITY WITH BODY MASS INDEX (BMI) OF 30.0 TO 30.9 IN ADULT, UNSPECIFIED OBESITY TYPE, UNSPECIFIED WHETHER SERIOUS COMORBIDITY PRESENT: ICD-10-CM

## 2024-07-01 DIAGNOSIS — I48.0 PAROXYSMAL ATRIAL FIBRILLATION (MULTI): ICD-10-CM

## 2024-07-01 DIAGNOSIS — I10 HYPERTENSION, BENIGN: ICD-10-CM

## 2024-07-01 DIAGNOSIS — G47.33 OBSTRUCTIVE SLEEP APNEA: ICD-10-CM

## 2024-07-01 PROBLEM — I48.20 CHRONIC ATRIAL FIBRILLATION (MULTI): Status: RESOLVED | Noted: 2024-02-29 | Resolved: 2024-07-01

## 2024-07-01 PROBLEM — I48.91 ATRIAL FIBRILLATION (MULTI): Status: RESOLVED | Noted: 2023-03-24 | Resolved: 2024-07-01

## 2024-07-01 PROCEDURE — 1160F RVW MEDS BY RX/DR IN RCRD: CPT | Performed by: INTERNAL MEDICINE

## 2024-07-01 PROCEDURE — 1159F MED LIST DOCD IN RCRD: CPT | Performed by: INTERNAL MEDICINE

## 2024-07-01 PROCEDURE — 3075F SYST BP GE 130 - 139MM HG: CPT | Performed by: INTERNAL MEDICINE

## 2024-07-01 PROCEDURE — 1157F ADVNC CARE PLAN IN RCRD: CPT | Performed by: INTERNAL MEDICINE

## 2024-07-01 PROCEDURE — 3008F BODY MASS INDEX DOCD: CPT | Performed by: INTERNAL MEDICINE

## 2024-07-01 PROCEDURE — 3079F DIAST BP 80-89 MM HG: CPT | Performed by: INTERNAL MEDICINE

## 2024-07-01 PROCEDURE — 99214 OFFICE O/P EST MOD 30 MIN: CPT | Performed by: INTERNAL MEDICINE

## 2024-07-01 NOTE — PROGRESS NOTES
"Ozzie Complaint:   Follow-up (Yearly)     History Of Present Illness:    Savita Buchanan is a 68 y.o. female presenting with PAFIB.  Had cryoablation 1/23/24  No AFIB since  Patient denies chest pain/SOB/palpitations/dizziness/lightheadedness/edema/claudication  No regular exercise         Last Recorded Vitals:  Vitals:    07/01/24 1110 07/01/24 1136   BP: 138/82 138/85   BP Location: Left arm    Patient Position: Sitting    BP Cuff Size: Adult    Pulse: 70    SpO2: 100%    Weight: 84.4 kg (186 lb)    Height: 1.676 m (5' 6\")             Allergies:  Penicillins, Latex, Phenobarbital, Sulfa (sulfonamide antibiotics), Adhesive, and Neomycin-bacitracin-polymyxin    Outpatient Medications:  Current Outpatient Medications   Medication Instructions    ascorbic acid (Vitamin C) 500 mg tablet 1 tablet, oral, Daily    cholecalciferol, vitamin D3, 250 mcg (10,000 unit) capsule 1 capsule, oral, Daily    cyanocobalamin (Vitamin B-12) 100 mcg tablet 1 tablet, oral, Daily    flecainide (TAMBOCOR) 150 mg, oral, 2 times daily    gabapentin (NEURONTIN) 300 mg, oral, 2 times daily    Lactobacillus acidophilus (PROBIOTIC ORAL) 1 tablet, oral, Daily    lisinopriL-hydrochlorothiazide 20-12.5 mg tablet 1 tablet, oral, Daily    lisinopril 20 mg, oral, Daily    magnesium oxide 500 mg tablet 1 tablet, oral, Daily    melatonin 5 mg, oral, Nightly    metoprolol succinate XL (TOPROL-XL) 50 mg, oral, Daily, Do not crush or chew.    multivitamin tablet 1 tablet, oral, Daily    pantoprazole (PROTONIX) 40 mg, oral, Daily, Do not crush, chew, or split.    potassium chloride CR 20 mEq ER tablet 20 mEq, oral, Every 12 hours    simvastatin (ZOCOR) 10 mg, oral, Daily    warfarin (COUMADIN) 2 mg, oral, 5 times weekly, On Tuesday, Wednesday, Thursday, Saturday, and Sunday    warfarin (JANTOVEN) 4 mg, oral, 2 times weekly, On Monday and Friday    zinc sulfate (Zincate) 220 (50 Zn) MG capsule 1 capsule, oral, Daily       Physical Exam:  Constitutional:  "      General: Awake.      Appearance: Not in distress. Obese.   Neck:      Vascular: No JVR. JVD normal.   Pulmonary:      Effort: Pulmonary effort is normal.      Breath sounds: Normal breath sounds. No wheezing. No rhonchi. No rales.   Chest:      Chest wall: Not tender to palpatation.   Cardiovascular:      PMI at left midclavicular line. Normal rate. Regular rhythm. Normal S1. Normal S2.       Murmurs: There is no murmur.      No gallop.  No click. No rub.   Pulses:     Intact distal pulses.   Edema:     Peripheral edema absent.   Abdominal:      General: Abdomen is protuberant. Bowel sounds are normal.      Palpations: Abdomen is soft.      Tenderness: There is no abdominal tenderness.   Musculoskeletal: Normal range of motion.         General: No tenderness. Skin:     General: Skin is warm and dry.   Neurological:      General: No focal deficit present.      Mental Status: Alert and oriented to person, place and time.          Last Labs:  CBC -  Lab Results   Component Value Date    WBC 13.6 (H) 01/12/2024    HGB 15.9 01/12/2024    HCT 47.8 (H) 01/12/2024    MCV 90 01/12/2024     01/12/2024       CMP -  Lab Results   Component Value Date    CALCIUM 9.8 01/12/2024    PROT 7.7 01/12/2024    ALBUMIN 4.4 01/12/2024    AST 30 01/12/2024    ALT 32 01/12/2024    ALKPHOS 82 01/12/2024    BILITOT 0.4 01/12/2024       LIPID PANEL -   Lab Results   Component Value Date    CHOL 140 11/30/2023    TRIG 255 (H) 11/30/2023    HDL 45.4 11/30/2023    CHHDL 3.1 11/30/2023    LDLF 62 10/17/2019    VLDL 51 (H) 11/30/2023    NHDL 95 11/30/2023       RENAL FUNCTION PANEL -   Lab Results   Component Value Date    GLUCOSE 117 (H) 01/12/2024     01/12/2024    K 4.1 01/12/2024     01/12/2024    CO2 25 01/12/2024    ANIONGAP 16 01/12/2024    BUN 17 01/12/2024    CREATININE 0.71 01/12/2024    CALCIUM 9.8 01/12/2024    ALBUMIN 4.4 01/12/2024        Lab Results   Component Value Date     (H) 01/12/2024    HGBA1C  6.2 (A) 11/15/2022           Lab review: I have personally reviewed the laboratory result(s)       Problem List Items Addressed This Visit       Paroxysmal atrial fibrillation (Multi)    Overview     Long standing PAFIB  Had increasing frequency thus had cryoablation 1/2024.Now in NSR.  Discuss decreasing flecainide with EP   On warfarin. Follows in Coumadin Clinic.           Coronary artery calcification - Primary    Overview     Per coronary calcium on CT chest.   Patient with no angina and 5/4/2020 stress echo w/decreased exercise tolerance but no ischemia and NL LVEF.   On statin and warfarin.          Hyperlipidemia    Overview     On low-intensity statin  11/2023 PJTVJ=282,lDL=44  Note TG=255-needs weight loss         Hypertension, benign    Overview     BP borderline elevated  Weight loss/watch salt intake         Obstructive sleep apnea    Overview     On CPAP         Class 1 obesity with body mass index (BMI) of 30.0 to 30.9 in adult    Mitral regurgitation    Overview     Moderate-severe per 12/2023 echo  NL LVEF  No signs CHF  Recheck December          Echo=Dec=MR  Weight loss  Watch salt intake  Deep Nur, DO

## 2024-07-22 DIAGNOSIS — I48.91 ATRIAL FIBRILLATION, UNSPECIFIED TYPE (MULTI): Primary | ICD-10-CM

## 2024-07-22 RX ORDER — WARFARIN 4 MG/1
TABLET ORAL
Qty: 55 TABLET | Refills: 1 | Status: SHIPPED | OUTPATIENT
Start: 2024-07-22 | End: 2025-07-22

## 2024-07-25 ENCOUNTER — ANTICOAGULATION - WARFARIN VISIT (OUTPATIENT)
Dept: PHARMACY | Facility: CLINIC | Age: 69
End: 2024-07-25
Payer: MEDICARE

## 2024-07-25 DIAGNOSIS — I48.91 ATRIAL FIBRILLATION, UNSPECIFIED TYPE (MULTI): Primary | ICD-10-CM

## 2024-07-25 LAB
POC INR: 2.7
POC PROTHROMBIN TIME: NORMAL

## 2024-07-25 PROCEDURE — 85610 PROTHROMBIN TIME: CPT | Mod: QW

## 2024-07-25 PROCEDURE — 99212 OFFICE O/P EST SF 10 MIN: CPT

## 2024-07-25 NOTE — PROGRESS NOTES
No bleeding or unusual bruising.  Medications and doses verified.  No scheduled procedures at this time.  INR=2.7   Plan: Continue same weekly dose.  Follow up INR check in 5 weeks.

## 2024-08-05 DIAGNOSIS — I10 HYPERTENSION, BENIGN: ICD-10-CM

## 2024-08-05 RX ORDER — LISINOPRIL AND HYDROCHLOROTHIAZIDE 12.5; 2 MG/1; MG/1
1 TABLET ORAL DAILY
Qty: 90 TABLET | Refills: 0 | Status: SHIPPED | OUTPATIENT
Start: 2024-08-05

## 2024-08-05 NOTE — TELEPHONE ENCOUNTER
Patient left a VM requesting a refill for lisinopril-hydrochlorothiazide to be sent to [facility] on file.

## 2024-08-29 ENCOUNTER — ANTICOAGULATION - WARFARIN VISIT (OUTPATIENT)
Dept: PHARMACY | Facility: CLINIC | Age: 69
End: 2024-08-29
Payer: MEDICARE

## 2024-08-29 DIAGNOSIS — I48.91 ATRIAL FIBRILLATION, UNSPECIFIED TYPE (MULTI): Primary | ICD-10-CM

## 2024-08-29 LAB
POC INR: 2.6
POC PROTHROMBIN TIME: NORMAL

## 2024-08-29 PROCEDURE — 99212 OFFICE O/P EST SF 10 MIN: CPT | Performed by: PHARMACIST

## 2024-08-29 PROCEDURE — 85610 PROTHROMBIN TIME: CPT | Mod: QW | Performed by: PHARMACIST

## 2024-08-29 NOTE — PROGRESS NOTES
Verified current dose with pt.    No new meds or med changes since last visit.  Pt denies unusual bleed/bruise.  No upcoming procedures.  Inr = 2.6  Continue same dose and check again in 5 weeks.

## 2024-09-10 DIAGNOSIS — I10 HYPERTENSION, BENIGN: ICD-10-CM

## 2024-09-10 RX ORDER — LISINOPRIL 20 MG/1
20 TABLET ORAL DAILY
Qty: 90 TABLET | Refills: 3 | Status: SHIPPED | OUTPATIENT
Start: 2024-09-10

## 2024-09-24 DIAGNOSIS — E78.00 PURE HYPERCHOLESTEROLEMIA: ICD-10-CM

## 2024-09-24 RX ORDER — SIMVASTATIN 10 MG/1
10 TABLET, FILM COATED ORAL DAILY
Qty: 90 TABLET | Refills: 0 | Status: SHIPPED | OUTPATIENT
Start: 2024-09-24

## 2024-10-03 ENCOUNTER — ANTICOAGULATION - WARFARIN VISIT (OUTPATIENT)
Dept: PHARMACY | Facility: CLINIC | Age: 69
End: 2024-10-03
Payer: MEDICARE

## 2024-10-03 DIAGNOSIS — I48.0 PAROXYSMAL ATRIAL FIBRILLATION (MULTI): Primary | ICD-10-CM

## 2024-10-03 LAB
POC INR: 2.6
POC PROTHROMBIN TIME: NORMAL

## 2024-10-03 PROCEDURE — 99212 OFFICE O/P EST SF 10 MIN: CPT

## 2024-10-03 PROCEDURE — 85610 PROTHROMBIN TIME: CPT | Mod: QW

## 2024-10-03 NOTE — PROGRESS NOTES
Coumadin Clinic Visit Note    Patient verified warfarin dose  No missed doses  No unusual bruising or bleeding  No changes to medications, reported taking tylenol three times due to pain from moving houses  Consistent dietary green intake  No anticipated procedures at this time  INR Therapeutic today at 2.6  No changes to warfarin dose today  Next appointment 5 weeks  She may be transferring to a different clinic due to moving, will call and let us know if she is transferring clinics.     Son Ramirez, PharmD

## 2024-10-29 DIAGNOSIS — I10 HYPERTENSION, BENIGN: ICD-10-CM

## 2024-10-29 RX ORDER — LISINOPRIL AND HYDROCHLOROTHIAZIDE 12.5; 2 MG/1; MG/1
1 TABLET ORAL DAILY
Qty: 90 TABLET | Refills: 0 | Status: SHIPPED | OUTPATIENT
Start: 2024-10-29

## 2024-11-07 ENCOUNTER — ANTICOAGULATION - WARFARIN VISIT (OUTPATIENT)
Dept: PHARMACY | Facility: CLINIC | Age: 69
End: 2024-11-07
Payer: MEDICARE

## 2024-11-07 DIAGNOSIS — I48.0 PAROXYSMAL ATRIAL FIBRILLATION (MULTI): Primary | ICD-10-CM

## 2024-11-07 LAB
POC INR: 2.5
POC PROTHROMBIN TIME: NORMAL

## 2024-11-07 PROCEDURE — 99212 OFFICE O/P EST SF 10 MIN: CPT | Performed by: PHARMACIST

## 2024-11-07 PROCEDURE — 85610 PROTHROMBIN TIME: CPT | Mod: QW | Performed by: PHARMACIST

## 2024-11-07 NOTE — PROGRESS NOTES
Coumadin Clinic Visit Note    Patient presents today for anticoagulation monitoring and adjustment.  Patient verified warfarin dosing schedule  Patient denies missing any doses  Patient denies unusual bruising or bleeding  Patient denies changes to medications, alcohol or tobacco use.  Consistent dietary green intake  There are no anticipated procedures at this time  INR Therapeutic today at 2.5  No changes to warfarin dose today  Next appointment 4 weeks      Yamilex Vickers, PharmD

## 2024-11-12 DIAGNOSIS — I48.0 PAROXYSMAL ATRIAL FIBRILLATION (MULTI): ICD-10-CM

## 2024-11-12 RX ORDER — FLECAINIDE ACETATE 100 MG/1
150 TABLET ORAL 2 TIMES DAILY
Qty: 270 TABLET | Refills: 3 | Status: SHIPPED | OUTPATIENT
Start: 2024-11-12 | End: 2025-11-12

## 2024-12-03 ENCOUNTER — APPOINTMENT (OUTPATIENT)
Dept: PRIMARY CARE | Facility: CLINIC | Age: 69
End: 2024-12-03
Payer: MEDICARE

## 2024-12-03 VITALS
DIASTOLIC BLOOD PRESSURE: 78 MMHG | SYSTOLIC BLOOD PRESSURE: 140 MMHG | TEMPERATURE: 97.6 F | BODY MASS INDEX: 28.25 KG/M2 | WEIGHT: 175.8 LBS | HEIGHT: 66 IN

## 2024-12-03 DIAGNOSIS — R79.89 ABNORMAL TSH: ICD-10-CM

## 2024-12-03 DIAGNOSIS — G47.33 OBSTRUCTIVE SLEEP APNEA: ICD-10-CM

## 2024-12-03 DIAGNOSIS — E03.9 HYPOTHYROIDISM, UNSPECIFIED TYPE: ICD-10-CM

## 2024-12-03 DIAGNOSIS — Z00.00 MEDICARE ANNUAL WELLNESS VISIT, SUBSEQUENT: Primary | ICD-10-CM

## 2024-12-03 DIAGNOSIS — I48.0 PAROXYSMAL ATRIAL FIBRILLATION (MULTI): ICD-10-CM

## 2024-12-03 DIAGNOSIS — R73.9 BORDERLINE HYPERGLYCEMIA: ICD-10-CM

## 2024-12-03 DIAGNOSIS — I10 HYPERTENSION, BENIGN: ICD-10-CM

## 2024-12-03 DIAGNOSIS — E66.3 OVERWEIGHT (BMI 25.0-29.9): ICD-10-CM

## 2024-12-03 DIAGNOSIS — E78.5 HYPERLIPIDEMIA, UNSPECIFIED HYPERLIPIDEMIA TYPE: ICD-10-CM

## 2024-12-03 LAB
BASOPHILS # BLD AUTO: 0.06 X10*3/UL (ref 0–0.1)
BASOPHILS NFR BLD AUTO: 0.5 %
EOSINOPHIL # BLD AUTO: 0.27 X10*3/UL (ref 0–0.4)
EOSINOPHIL NFR BLD AUTO: 2.3 %
ERYTHROCYTE [DISTWIDTH] IN BLOOD BY AUTOMATED COUNT: 14.6 % (ref 11.5–14.5)
EST. AVERAGE GLUCOSE BLD GHB EST-MCNC: 128 MG/DL
HBA1C MFR BLD: 6.1 %
HCT VFR BLD AUTO: 45.9 % (ref 36–46)
HGB BLD-MCNC: 14.9 G/DL (ref 12–16)
IMM GRANULOCYTES # BLD AUTO: 0.04 X10*3/UL (ref 0–0.5)
IMM GRANULOCYTES NFR BLD AUTO: 0.3 % (ref 0–0.9)
LYMPHOCYTES # BLD AUTO: 3.37 X10*3/UL (ref 0.8–3)
LYMPHOCYTES NFR BLD AUTO: 28.9 %
MCH RBC QN AUTO: 29.2 PG (ref 26–34)
MCHC RBC AUTO-ENTMCNC: 32.5 G/DL (ref 32–36)
MCV RBC AUTO: 90 FL (ref 80–100)
MONOCYTES # BLD AUTO: 0.65 X10*3/UL (ref 0.05–0.8)
MONOCYTES NFR BLD AUTO: 5.6 %
NEUTROPHILS # BLD AUTO: 7.27 X10*3/UL (ref 1.6–5.5)
NEUTROPHILS NFR BLD AUTO: 62.4 %
NRBC BLD-RTO: 0 /100 WBCS (ref 0–0)
PLATELET # BLD AUTO: 294 X10*3/UL (ref 150–450)
RBC # BLD AUTO: 5.1 X10*6/UL (ref 4–5.2)
TSH SERPL-ACNC: 1.5 MIU/L (ref 0.44–3.98)
WBC # BLD AUTO: 11.7 X10*3/UL (ref 4.4–11.3)

## 2024-12-03 PROCEDURE — 1159F MED LIST DOCD IN RCRD: CPT | Performed by: FAMILY MEDICINE

## 2024-12-03 PROCEDURE — 3078F DIAST BP <80 MM HG: CPT | Performed by: FAMILY MEDICINE

## 2024-12-03 PROCEDURE — 1157F ADVNC CARE PLAN IN RCRD: CPT | Performed by: FAMILY MEDICINE

## 2024-12-03 PROCEDURE — 83036 HEMOGLOBIN GLYCOSYLATED A1C: CPT

## 2024-12-03 PROCEDURE — 84443 ASSAY THYROID STIM HORMONE: CPT

## 2024-12-03 PROCEDURE — 99214 OFFICE O/P EST MOD 30 MIN: CPT | Performed by: FAMILY MEDICINE

## 2024-12-03 PROCEDURE — 3008F BODY MASS INDEX DOCD: CPT | Performed by: FAMILY MEDICINE

## 2024-12-03 PROCEDURE — 1170F FXNL STATUS ASSESSED: CPT | Performed by: FAMILY MEDICINE

## 2024-12-03 PROCEDURE — 85025 COMPLETE CBC W/AUTO DIFF WBC: CPT

## 2024-12-03 PROCEDURE — G0439 PPPS, SUBSEQ VISIT: HCPCS | Performed by: FAMILY MEDICINE

## 2024-12-03 PROCEDURE — 80061 LIPID PANEL: CPT

## 2024-12-03 PROCEDURE — 1160F RVW MEDS BY RX/DR IN RCRD: CPT | Performed by: FAMILY MEDICINE

## 2024-12-03 PROCEDURE — 99497 ADVNCD CARE PLAN 30 MIN: CPT | Performed by: FAMILY MEDICINE

## 2024-12-03 PROCEDURE — 80053 COMPREHEN METABOLIC PANEL: CPT

## 2024-12-03 PROCEDURE — 1126F AMNT PAIN NOTED NONE PRSNT: CPT | Performed by: FAMILY MEDICINE

## 2024-12-03 PROCEDURE — 3077F SYST BP >= 140 MM HG: CPT | Performed by: FAMILY MEDICINE

## 2024-12-03 ASSESSMENT — ACTIVITIES OF DAILY LIVING (ADL)
WALKS IN HOME: INDEPENDENT
EATING: INDEPENDENT
GROCERY SHOPPING: INDEPENDENT
STIL DRIVING: YES
PREPARING MEALS: INDEPENDENT
TAKING MEDICATION: INDEPENDENT
ADEQUATE_TO_COMPLETE_ADL: YES
DRESSING YOURSELF: INDEPENDENT
NEEDS ASSISTANCE WITH FOOD: INDEPENDENT
USING TELEPHONE: INDEPENDENT
PATIENT'S MEMORY ADEQUATE TO SAFELY COMPLETE DAILY ACTIVITIES?: YES
TOILETING: INDEPENDENT
MANAGING FINANCES: INDEPENDENT
USING TRANSPORTATION: INDEPENDENT
DOING HOUSEWORK: INDEPENDENT
JUDGMENT_ADEQUATE_SAFELY_COMPLETE_DAILY_ACTIVITIES: YES
FEEDING YOURSELF: INDEPENDENT
GROOMING: INDEPENDENT
BATHING: INDEPENDENT

## 2024-12-03 ASSESSMENT — ANXIETY QUESTIONNAIRES
7. FEELING AFRAID AS IF SOMETHING AWFUL MIGHT HAPPEN: NOT AT ALL
GAD7 TOTAL SCORE: 0
5. BEING SO RESTLESS THAT IT IS HARD TO SIT STILL: NOT AT ALL
2. NOT BEING ABLE TO STOP OR CONTROL WORRYING: NOT AT ALL
3. WORRYING TOO MUCH ABOUT DIFFERENT THINGS: NOT AT ALL
1. FEELING NERVOUS, ANXIOUS, OR ON EDGE: NOT AT ALL
6. BECOMING EASILY ANNOYED OR IRRITABLE: NOT AT ALL
4. TROUBLE RELAXING: NOT AT ALL

## 2024-12-03 ASSESSMENT — GERIATRIC MINI NUTRITIONAL ASSESSMENT (MNA)
B WEIGHT LOSS DURING THE LAST 3 MONTHS: NO WEIGHT LOSS
E NEUROPSYCHOLOGICAL PROBLEMS: NO PSYCHOLOGICAL PROBLEMS
A HAS FOOD INTAKE DECLINED OVER THE PAST 3 MONTHS DUE TO LOSS OF APPETITE, DIGESTIVE PROBLEMS, CHEWING OR SWALLOWING DIFFICULTIES?: NO DECREASE IN FOOD INTAKE
D HAS SUFFERED PSYCHOLOGICAL STRESS OR ACUTE DISEASE IN THE PAST 3 MONTHS?: NO
C GENERAL MOBILITY: GOES OUT

## 2024-12-03 ASSESSMENT — ENCOUNTER SYMPTOMS
DEPRESSION: 0
GASTROINTESTINAL NEGATIVE: 1
CONSTITUTIONAL NEGATIVE: 1
LOSS OF SENSATION IN FEET: 0
OCCASIONAL FEELINGS OF UNSTEADINESS: 0
NEUROLOGICAL NEGATIVE: 1
BACK PAIN: 1
CARDIOVASCULAR NEGATIVE: 1
PSYCHIATRIC NEGATIVE: 1
RESPIRATORY NEGATIVE: 1
ARTHRALGIAS: 1

## 2024-12-03 ASSESSMENT — PAIN SCALES - GENERAL: PAINLEVEL_OUTOF10: 0-NO PAIN

## 2024-12-03 NOTE — PROGRESS NOTES
"Subjective   Patient ID: Savita Buchanan is a 69 y.o. female who presents for Annual Exam (Assessment annual medicare wellness fasting bw ).    HPI     Review of Systems   Constitutional: Negative.    HENT: Negative.     Respiratory: Negative.     Cardiovascular: Negative.         Dr Ulatowski Dr Ramicone   Gastrointestinal: Negative.    Genitourinary: Negative.    Musculoskeletal:  Positive for arthralgias and back pain.   Neurological: Negative.    Psychiatric/Behavioral: Negative.         Objective   /78 (BP Location: Left arm)   Temp 36.4 °C (97.6 °F) (Temporal)   Ht 1.676 m (5' 6\")   Wt 79.7 kg (175 lb 12.8 oz)   BMI 28.37 kg/m²     Physical Exam  Vitals and nursing note reviewed.   Constitutional:       Appearance: Normal appearance.   HENT:      Right Ear: Tympanic membrane normal.      Left Ear: Tympanic membrane normal.      Mouth/Throat:      Pharynx: Oropharynx is clear.   Cardiovascular:      Rate and Rhythm: Normal rate and regular rhythm.      Pulses: Normal pulses.      Heart sounds: Murmur heard.      Systolic murmur is present with a grade of 2/6.   Pulmonary:      Breath sounds: Normal breath sounds.   Abdominal:      Palpations: Abdomen is soft.   Musculoskeletal:         General: Normal range of motion.      Comments: DJD right hip with lumbar stenosis   Neurological:      General: No focal deficit present.      Mental Status: She is alert and oriented to person, place, and time.   Psychiatric:         Mood and Affect: Mood normal.         Behavior: Behavior normal.         Assessment/Plan patient seen here for an annual Medicare wellness exam.  We reviewed her questionnaire she is agreeable to her responses.  We did discuss advanced directives.  She has no significant difficulty with depression or anxiety.  We are drawing her lab work here today we will see her back in a year.  Problem List Items Addressed This Visit             ICD-10-CM    Abnormal TSH R79.89    Relevant Orders    " TSH with reflex to Free T4 if abnormal    Paroxysmal atrial fibrillation (Multi) I48.0    Borderline hyperglycemia R73.9    Relevant Orders    Hemoglobin A1C    Hyperlipidemia E78.5    Relevant Orders    Lipid Panel    Hypertension, benign I10    Relevant Orders    CBC and Auto Differential    Comprehensive Metabolic Panel    Obstructive sleep apnea G47.33     Other Visit Diagnoses         Codes    Medicare annual wellness visit, subsequent    -  Primary Z00.00    Overweight (BMI 25.0-29.9)     E66.3    Hypothyroidism, unspecified type     E03.9    Relevant Orders    TSH with reflex to Free T4 if abnormal

## 2024-12-03 NOTE — ACP (ADVANCE CARE PLANNING)
Confirming Previous Code Status:   Advance Care Planning Note     Discussion Date: 12/03/24   Discussion Participants: patient    The patient wishes to discuss Advance Care Planning today and the following is a brief summary of our discussion.     Patient has capacity to make their own medical decisions: Yes  Health Care Agent/Surrogate Decision Maker documented in chart: Yes    Documents on file and valid:  Advance Directive/Living Will: Yes   Health Care Power of : Yes  Other: none    Communication of Medical Status/Prognosis:   yes     Communication of Treatment Goals/Options:   yes     Treatment Decisions  Goals of Care: survival is paramount regardless of prognosis, treatment outcome, or burden   yes  Follow Up Plan  no  Team Members  myself  Time Statement: Total face to face time spent on advance care planning was 16 minutes with 16 minutes spent in counseling, including the explanation.    James Franklin,   12/3/2024 9:57 AM

## 2024-12-04 ENCOUNTER — HOSPITAL ENCOUNTER (OUTPATIENT)
Dept: CARDIOLOGY | Facility: CLINIC | Age: 69
Discharge: HOME | End: 2024-12-04
Payer: MEDICARE

## 2024-12-04 VITALS — HEIGHT: 66 IN | BODY MASS INDEX: 28.37 KG/M2

## 2024-12-04 DIAGNOSIS — I34.0 MITRAL VALVE INSUFFICIENCY, UNSPECIFIED ETIOLOGY: ICD-10-CM

## 2024-12-04 LAB
ALBUMIN SERPL BCP-MCNC: 4.4 G/DL (ref 3.4–5)
ALP SERPL-CCNC: 86 U/L (ref 33–136)
ALT SERPL W P-5'-P-CCNC: 23 U/L (ref 7–45)
ANION GAP SERPL CALC-SCNC: 13 MMOL/L (ref 10–20)
AST SERPL W P-5'-P-CCNC: 24 U/L (ref 9–39)
BILIRUB SERPL-MCNC: 0.6 MG/DL (ref 0–1.2)
BUN SERPL-MCNC: 13 MG/DL (ref 6–23)
CALCIUM SERPL-MCNC: 10.1 MG/DL (ref 8.6–10.6)
CHLORIDE SERPL-SCNC: 103 MMOL/L (ref 98–107)
CHOLEST SERPL-MCNC: 146 MG/DL (ref 0–199)
CHOLESTEROL/HDL RATIO: 3.1
CO2 SERPL-SCNC: 28 MMOL/L (ref 21–32)
CREAT SERPL-MCNC: 0.64 MG/DL (ref 0.5–1.05)
EGFRCR SERPLBLD CKD-EPI 2021: >90 ML/MIN/1.73M*2
GLUCOSE SERPL-MCNC: 100 MG/DL (ref 74–99)
HDLC SERPL-MCNC: 47.5 MG/DL
LDLC SERPL CALC-MCNC: 59 MG/DL
NON HDL CHOLESTEROL: 99 MG/DL (ref 0–149)
POTASSIUM SERPL-SCNC: 4.2 MMOL/L (ref 3.5–5.3)
PROT SERPL-MCNC: 7.5 G/DL (ref 6.4–8.2)
SODIUM SERPL-SCNC: 140 MMOL/L (ref 136–145)
TRIGL SERPL-MCNC: 200 MG/DL (ref 0–149)
VLDL: 40 MG/DL (ref 0–40)

## 2024-12-04 PROCEDURE — 93306 TTE W/DOPPLER COMPLETE: CPT

## 2024-12-04 PROCEDURE — 93306 TTE W/DOPPLER COMPLETE: CPT | Performed by: INTERNAL MEDICINE

## 2024-12-05 ENCOUNTER — APPOINTMENT (OUTPATIENT)
Dept: PHARMACY | Facility: CLINIC | Age: 69
End: 2024-12-05
Payer: MEDICARE

## 2024-12-05 ENCOUNTER — ANTICOAGULATION - WARFARIN VISIT (OUTPATIENT)
Dept: PHARMACY | Facility: CLINIC | Age: 69
End: 2024-12-05
Payer: MEDICARE

## 2024-12-05 ENCOUNTER — DOCUMENTATION (OUTPATIENT)
Dept: PHARMACY | Facility: CLINIC | Age: 69
End: 2024-12-05

## 2024-12-05 ENCOUNTER — ANTICOAGULATION - WARFARIN VISIT (OUTPATIENT)
Dept: PHARMACY | Facility: CLINIC | Age: 69
End: 2024-12-05

## 2024-12-05 DIAGNOSIS — I48.0 PAROXYSMAL ATRIAL FIBRILLATION (MULTI): Primary | ICD-10-CM

## 2024-12-05 LAB
AORTIC VALVE MEAN GRADIENT: 4 MMHG
AORTIC VALVE PEAK VELOCITY: 1.32 M/S
AV PEAK GRADIENT: 7 MMHG
AVA (PEAK VEL): 2.06 CM2
AVA (VTI): 2.22 CM2
EJECTION FRACTION APICAL 4 CHAMBER: 65.6
EJECTION FRACTION: 66 %
LEFT VENTRICLE INTERNAL DIMENSION DIASTOLE: 4.25 CM (ref 3.5–6)
LEFT VENTRICULAR OUTFLOW TRACT DIAMETER: 1.98 CM
MITRAL VALVE E/A RATIO: 2.19
POC INR: 3
POC PROTHROMBIN TIME: NORMAL
RIGHT VENTRICLE FREE WALL PEAK S': 12 CM/S
TRICUSPID ANNULAR PLANE SYSTOLIC EXCURSION: 2.2 CM

## 2024-12-05 PROCEDURE — 99212 OFFICE O/P EST SF 10 MIN: CPT | Performed by: PHARMACIST

## 2024-12-05 PROCEDURE — 85610 PROTHROMBIN TIME: CPT | Mod: QW | Performed by: PHARMACIST

## 2024-12-05 NOTE — PROGRESS NOTES
Verified current dose with pt.    No new meds or med changes since last visit.  Pt denies unusual bleed/bruise.  No upcoming procedures.  Pt has lost about 15 lbs from moving  No missed doses  Inr = 3  Continue same dose  Pt is leaving our clinic for [place] due to moving [facility]

## 2024-12-06 ENCOUNTER — TELEPHONE (OUTPATIENT)
Dept: CARDIOLOGY | Facility: CLINIC | Age: 69
End: 2024-12-06
Payer: MEDICARE

## 2024-12-06 DIAGNOSIS — I48.0 PAROXYSMAL ATRIAL FIBRILLATION (MULTI): ICD-10-CM

## 2024-12-10 NOTE — TELEPHONE ENCOUNTER
Spoke to Silke from [place] [facility] - new order placed. Made aware patient moved and would like to be seen in [place] instead of [place] [facility].

## 2024-12-11 PROBLEM — I48.0 PAROXYSMAL ATRIAL FIBRILLATION (MULTI): Status: RESOLVED | Noted: 2023-03-24 | Resolved: 2024-12-11

## 2024-12-16 PROBLEM — E66.811 CLASS 1 OBESITY WITH BODY MASS INDEX (BMI) OF 30.0 TO 30.9 IN ADULT: Status: RESOLVED | Noted: 2024-02-22 | Resolved: 2024-12-16

## 2024-12-26 ENCOUNTER — TELEPHONE (OUTPATIENT)
Dept: PRIMARY CARE | Facility: CLINIC | Age: 69
End: 2024-12-26
Payer: MEDICARE

## 2024-12-26 DIAGNOSIS — G62.9 NEUROPATHY: ICD-10-CM

## 2024-12-26 DIAGNOSIS — E78.00 PURE HYPERCHOLESTEROLEMIA: ICD-10-CM

## 2024-12-26 RX ORDER — GABAPENTIN 300 MG/1
300 CAPSULE ORAL 2 TIMES DAILY
Qty: 180 CAPSULE | Refills: 1 | Status: SHIPPED | OUTPATIENT
Start: 2024-12-26

## 2024-12-26 RX ORDER — SIMVASTATIN 10 MG/1
10 TABLET, FILM COATED ORAL DAILY
Qty: 90 TABLET | Refills: 3 | Status: SHIPPED | OUTPATIENT
Start: 2024-12-26

## 2024-12-26 NOTE — TELEPHONE ENCOUNTER
Pt was in recently & needed refill on simvastatin 10 mg & gabapentin 300 mg twice daily [facility]  [phone]  Ty  Allergy to      PenicillinsHives, Shortness of breath  LatexHives  PhenobarbitalHives  Sulfa (Sulfonamide Antibiotics)Hives  AdhesiveRash  Neomycin-bacitracin-polymyxinRash

## 2025-01-02 ENCOUNTER — APPOINTMENT (OUTPATIENT)
Dept: CARDIOLOGY | Facility: CLINIC | Age: 70
End: 2025-01-02
Payer: MEDICARE

## 2025-01-02 VITALS
OXYGEN SATURATION: 98 % | WEIGHT: 173 LBS | DIASTOLIC BLOOD PRESSURE: 80 MMHG | SYSTOLIC BLOOD PRESSURE: 145 MMHG | BODY MASS INDEX: 27.92 KG/M2 | HEART RATE: 63 BPM

## 2025-01-02 DIAGNOSIS — G47.33 OBSTRUCTIVE SLEEP APNEA: Primary | ICD-10-CM

## 2025-01-02 DIAGNOSIS — Z72.0 TOBACCO ABUSE: ICD-10-CM

## 2025-01-02 DIAGNOSIS — E78.5 HYPERLIPIDEMIA, UNSPECIFIED HYPERLIPIDEMIA TYPE: ICD-10-CM

## 2025-01-02 DIAGNOSIS — I25.10 CORONARY ARTERY CALCIFICATION: ICD-10-CM

## 2025-01-02 DIAGNOSIS — I34.0 MITRAL VALVE INSUFFICIENCY, UNSPECIFIED ETIOLOGY: ICD-10-CM

## 2025-01-02 DIAGNOSIS — I10 HYPERTENSION, BENIGN: ICD-10-CM

## 2025-01-02 DIAGNOSIS — I48.0 PAROXYSMAL ATRIAL FIBRILLATION (MULTI): ICD-10-CM

## 2025-01-02 PROCEDURE — 1157F ADVNC CARE PLAN IN RCRD: CPT | Performed by: INTERNAL MEDICINE

## 2025-01-02 PROCEDURE — 99214 OFFICE O/P EST MOD 30 MIN: CPT | Performed by: INTERNAL MEDICINE

## 2025-01-02 PROCEDURE — 1159F MED LIST DOCD IN RCRD: CPT | Performed by: INTERNAL MEDICINE

## 2025-01-02 PROCEDURE — 3079F DIAST BP 80-89 MM HG: CPT | Performed by: INTERNAL MEDICINE

## 2025-01-02 PROCEDURE — 3077F SYST BP >= 140 MM HG: CPT | Performed by: INTERNAL MEDICINE

## 2025-01-02 PROCEDURE — G2211 COMPLEX E/M VISIT ADD ON: HCPCS | Performed by: INTERNAL MEDICINE

## 2025-01-02 NOTE — PROGRESS NOTES
Ozzie Complaint:   6 Month Follow Up (PA FIB)     History Of Present Illness:    Savita Buchanan is a 69 y.o. female presenting with PAFIB.  Had cryoablation 1/23/24  No AFIB since  Patient denies chest pain/SOB/palpitations/dizziness/lightheadedness/edema/claudication  No regular exercise but very active with recent move  No bleeding with warfarin  Still smokes but cutting back         Last Recorded Vitals:  Vitals:    01/02/25 1123 01/02/25 1135   BP: 140/82 145/80   BP Location: Right arm    Patient Position: Sitting    BP Cuff Size: Adult    Pulse: 63    SpO2: 98%    Weight: 78.5 kg (173 lb)             Allergies:  Penicillins, Latex, Phenobarbital, Sulfa (sulfonamide antibiotics), Adhesive, and Neomycin-bacitracin-polymyxin    Outpatient Medications:  Current Outpatient Medications   Medication Instructions    ascorbic acid (Vitamin C) 500 mg tablet 1 tablet, Daily    cholecalciferol, vitamin D3, 250 mcg (10,000 unit) capsule 1 capsule, Daily    cyanocobalamin (Vitamin B-12) 100 mcg tablet 1 tablet, Daily    flecainide (TAMBOCOR) 150 mg, oral, 2 times daily    gabapentin (NEURONTIN) 300 mg, oral, 2 times daily    Lactobacillus acidophilus (PROBIOTIC ORAL) 1 tablet, Daily    lisinopriL-hydrochlorothiazide 20-12.5 mg tablet 1 tablet, oral, Daily    lisinopril 20 mg, oral, Daily    magnesium oxide 500 mg tablet 1 tablet, Daily    melatonin 5 mg, Nightly    metoprolol succinate XL (TOPROL-XL) 50 mg, oral, Daily, Do not crush or chew.    multivitamin tablet 1 tablet, Daily    potassium chloride CR 20 mEq ER tablet 20 mEq, oral, Every 12 hours    simvastatin (ZOCOR) 10 mg, oral, Daily    warfarin (Jantoven) 4 mg tablet Take 4 mg (1 tablet) every Friday and 2 mg (1/2 tablet) all other days or as directed by the anticoagulation clinic.    zinc sulfate (Zincate) 220 (50 Zn) MG capsule 1 capsule, Daily       Physical Exam:  Constitutional:       General: Awake.      Appearance: Not in distress. Obese.   Neck:       Vascular: No JVR. JVD normal.   Pulmonary:      Effort: Pulmonary effort is normal.      Breath sounds: Normal breath sounds. No wheezing. No rhonchi. No rales.   Chest:      Chest wall: Not tender to palpatation.   Cardiovascular:      PMI at left midclavicular line. Normal rate. Regular rhythm. Normal S1. Normal S2.       Murmurs: There is no murmur.      No gallop.  No click. No rub.   Pulses:     Intact distal pulses.   Edema:     Peripheral edema absent.   Abdominal:      General: Abdomen is protuberant. Bowel sounds are normal.      Palpations: Abdomen is soft.      Tenderness: There is no abdominal tenderness.   Musculoskeletal: Normal range of motion.         General: No tenderness. Skin:     General: Skin is warm and dry.   Neurological:      General: No focal deficit present.      Mental Status: Alert and oriented to person, place and time.          Last Labs:  CBC -  Lab Results   Component Value Date    WBC 11.7 (H) 12/03/2024    HGB 14.9 12/03/2024    HCT 45.9 12/03/2024    MCV 90 12/03/2024     12/03/2024       CMP -  Lab Results   Component Value Date    CALCIUM 10.1 12/03/2024    PROT 7.5 12/03/2024    ALBUMIN 4.4 12/03/2024    AST 24 12/03/2024    ALT 23 12/03/2024    ALKPHOS 86 12/03/2024    BILITOT 0.6 12/03/2024       LIPID PANEL -   Lab Results   Component Value Date    CHOL 146 12/03/2024    TRIG 200 (H) 12/03/2024    HDL 47.5 12/03/2024    CHHDL 3.1 12/03/2024    LDLF 62 10/17/2019    VLDL 40 12/03/2024    NHDL 99 12/03/2024   Ldl=59    RENAL FUNCTION PANEL -   Lab Results   Component Value Date    GLUCOSE 100 (H) 12/03/2024     12/03/2024    K 4.2 12/03/2024     12/03/2024    CO2 28 12/03/2024    ANIONGAP 13 12/03/2024    BUN 13 12/03/2024    CREATININE 0.64 12/03/2024    CALCIUM 10.1 12/03/2024    ALBUMIN 4.4 12/03/2024        Lab Results   Component Value Date     (H) 01/12/2024    HGBA1C 6.1 (H) 12/03/2024           Lab review: I have personally reviewed the  laboratory result(s)       Problem List Items Addressed This Visit       Coronary artery calcification    Overview     Per coronary calcium on CT chest.   Patient with no angina and 5/4/2020 stress echo w/decreased exercise tolerance but no ischemia and NL LVEF.   On statin and warfarin.          Hyperlipidemia    Overview     On low-intensity statin  12/2024  PHTCP=404 ,LDL=59,OK=914  Note TG=255-needs weight loss         Hypertension, benign    Overview     BP borderline elevated  Generally lower at home  Weight loss/watch salt intake         Obstructive sleep apnea - Primary    Overview     On CPAP         Tobacco abuse    Overview     Is cutting back  Discussed stopping completely         Mitral regurgitation    Overview     Moderate-severe per 12/2023 echo  Moderate per 12/2024 echo  NL LVEF  No signs CHF  Follow        See Dr.Ramicone=PAFIB  Stop smoking  Weight loss  Watch salt intake  Deep Nur, DO

## 2025-01-03 ENCOUNTER — ANTICOAGULATION - WARFARIN VISIT (OUTPATIENT)
Dept: CARDIOLOGY | Facility: CLINIC | Age: 70
End: 2025-01-03
Payer: MEDICARE

## 2025-01-03 DIAGNOSIS — Z79.01 LONG TERM (CURRENT) USE OF ANTICOAGULANTS: Primary | ICD-10-CM

## 2025-01-03 DIAGNOSIS — I48.0 PAROXYSMAL ATRIAL FIBRILLATION (MULTI): ICD-10-CM

## 2025-01-03 LAB
POC INR: 1.5
POC PROTHROMBIN TIME: NORMAL

## 2025-01-03 PROCEDURE — 85610 PROTHROMBIN TIME: CPT | Mod: QW

## 2025-01-03 PROCEDURE — 99211 OFF/OP EST MAY X REQ PHY/QHP: CPT

## 2025-01-03 NOTE — PROGRESS NOTES
Patient identification verified with 2 identifiers.    Location: [facility] - [address] [place], [place] [address] [phone]     Referring Physician: Deep Nur MD  Enrollment/ Re-enrollment date: December 10, 2025   INR Goal: 2.0-3.0  INR monitoring is per [facility] protocol.  Anticoagulation Medication: warfarin  Indication: Atrial Fibrillation/Atrial Flutter  Patient has been on warfarin since 2018.  Patient transferring to the [facility] from the [facility] as patient has recently relocated to [facility].      Subjective   Bleeding signs/symptoms: No    Bruising: No   Major bleeding event: No  Thrombosis signs/symptoms: No  Thromboembolic event: No  Missed doses: Yes  Patient reports that she also accidentally took 2mg of warfarin on her 4mg dose day this past week.  Extra doses: No  Medication changes: No  Dietary changes: Yes  Patient reports that she has consumed vitamin k rich foods everyday this past week which is unusual for her.  Patient reports that she typically does not consume vitamin k at all. Patient will resume usual eating habits starting today.  Change in health: No  Change in activity: No  Alcohol: No  Other concerns: No    Upcoming Procedures:  Does the Patient Have any upcoming procedures that require interruption in anticoagulation therapy? no  Does the patient require bridging? no    Previous INR on 2024, was therapeutic at 3.    Anticoagulation Summary  As of 1/3/2025      INR goal:  2.0-3.0   TTR:  --   INR used for dosin.50 (1/3/2025)   Weekly warfarin total:  16 mg               Assessment/Plan   Subtherapeutic   Patient's INRs have been therapeutic on a dose of 16mg/weekly since .  1. New dose:   Will MAINTAIN TWD of warfarin given temporary changes in patient's dietary habits and accidental dosing noncompliance.     2. Next INR: 1 week      Education provided to patient during the visit:  Patient  instructed to call in interim with questions, concerns and changes.   Patient educated on interactions between medications and warfarin.   Patient educated on dietary consistency in vitamin k consumption.   Patient educated on affects of alcohol consumption while taking warfarin.   Patient educated on signs of bleeding/clotting.   Patient educated on compliance with dosing, follow up appointments, and prescribed plan of care.

## 2025-01-04 PROBLEM — Z79.899 HIGH RISK MEDICATION USE: Status: ACTIVE | Noted: 2025-01-04

## 2025-01-04 NOTE — PROGRESS NOTES
"CARDIAC ELECTROPHYSIOLOGY PROGRESS NOTE    History Of Present Illness:      This is a 69-year-old female with atrial fibrillation.  She presents for a follow-up evaluation.  The patient underwent pulmonary vein isolation January 23, 2024, and reports no episodes of atrial fibrillation since the ablation procedure.  No side effects on flecainide but she would like to try to taper the dose or discontinue flecainide..    Past medical history:     Paroxysmal atrial fibrillation  Obstructive sleep apnea  Hypertension  History of back surgery    Review of Systems  Other review of systems negative     Last Recorded Vitals:      2/27/2024     9:30 AM 7/1/2024    11:10 AM 7/1/2024    11:36 AM 12/3/2024     9:27 AM 12/4/2024    10:36 AM 1/2/2025    11:23 AM 1/2/2025    11:35 AM   Vitals   Systolic 142 138 138 140  140 145   Diastolic 56 82 85 78  82 80   BP Location Left arm Left arm  Left arm  Right arm    Heart Rate 66 70    63    Temp    36.4 °C (97.6 °F)      Height 1.676 m (5' 6\") 1.676 m (5' 6\")  1.676 m (5' 6\") 1.676 m (5' 6\")     Weight (lb) 189 186  175.8  173    BMI 30.51 kg/m2 30.02 kg/m2  28.37 kg/m2 28.37 kg/m2 27.92 kg/m2    BSA (m2) 2 m2 1.98 m2  1.93 m2 1.93 m2 1.91 m2    Visit Report Report Report Report Report  Report Report     Allergies:  Penicillins, Latex, Phenobarbital, Sulfa (sulfonamide antibiotics), Adhesive, and Neomycin-bacitracin-polymyxin    Outpatient Medications:  Current Outpatient Medications   Medication Instructions    ascorbic acid (Vitamin C) 500 mg tablet 1 tablet, Daily    cholecalciferol, vitamin D3, 250 mcg (10,000 unit) capsule 1 capsule, Daily    cyanocobalamin (Vitamin B-12) 100 mcg tablet 1 tablet, Daily    flecainide (TAMBOCOR) 150 mg, oral, 2 times daily    gabapentin (NEURONTIN) 300 mg, oral, 2 times daily    Lactobacillus acidophilus (PROBIOTIC ORAL) 1 tablet, Daily    lisinopriL-hydrochlorothiazide 20-12.5 mg tablet 1 tablet, oral, Daily    lisinopril 20 mg, oral, Daily    " magnesium oxide 500 mg tablet 1 tablet, Daily    melatonin 5 mg, Nightly    metoprolol succinate XL (TOPROL-XL) 50 mg, oral, Daily, Do not crush or chew.    multivitamin tablet 1 tablet, Daily    potassium chloride CR 20 mEq ER tablet 20 mEq, oral, Every 12 hours    simvastatin (ZOCOR) 10 mg, oral, Daily    warfarin (Jantoven) 4 mg tablet Take 4 mg (1 tablet) every Friday and 2 mg (1/2 tablet) all other days or as directed by the anticoagulation clinic.    zinc sulfate (Zincate) 220 (50 Zn) MG capsule 1 capsule, Daily       Physical Exam:    General Appearance:  Alert, oriented, no distress  Skin:  Warm and dry  Head and Neck:  No elevation of JVP, no carotid bruits  Cardiac Exam:  Rhythm is regular, S1 and S2 are normal, no murmur S3 or S4  Lungs:  Clear to auscultation  Extremities:  no edema  Neurologic:  No focal deficits  Psychiatric:  Appropriate mood and behavior    Lab Results:    CMP:  Recent Labs     12/03/24  0950 01/12/24  1139 11/30/23  1556 11/09/23  1201 11/15/22  1009 11/11/21  1025 11/10/20  1019 06/11/20  1004    141 145 140 141 143 141 140   K 4.2 4.1 4.3 4.4 4.1 4.3 4.3 4.2    104 105 103 103 105 103 105   CO2 28 25 27 28 27 27 28 26   ANIONGAP 13 16 17 13 15 15 14 13   BUN 13 17 15 15 15 13 13 12   CREATININE 0.64 0.71 0.66 0.78 0.62 0.66 0.65 0.70   EGFR >90 >90 >90 82  --   --   --   --    MG  --  1.66  --  1.69  --   --   --   --      Recent Labs     12/03/24  0950 01/12/24  1139 11/30/23  1556 11/09/23  1201 11/15/22  1009   ALBUMIN 4.4 4.4 4.5 4.7 4.7   ALKPHOS 86 82 82 90 85   ALT 23 32 34 40 41   AST 24 30 31 37 41*   BILITOT 0.6 0.4 0.5 0.5 0.4     CBC:  Recent Labs     12/03/24  0950 01/12/24  1139 11/30/23  1556 11/09/23  1201 11/15/22  1009 11/11/21  1025 11/10/20  1019 11/05/19  1041   WBC 11.7* 13.6* 11.1 14.6* 10.2 9.5 8.7 9.1   HGB 14.9 15.9 14.7 17.0* 15.1 14.2 14.4 14.6   HCT 45.9 47.8* 46.5* 51.1* 48.4* 46.9* 47.0* 46.3*    282 266 342 277 273 263 297   MCV  90 90 92 91 95 96 97 94     COAG:   Recent Labs     01/03/25  0908 12/05/24  0000 11/07/24  0000 10/03/24  0000 08/29/24  0000 07/25/24  0000 06/20/24  0000 05/16/24  0944   INR 1.50 3.00 2.50 2.60 2.60 2.70 2.40 2.80     Cardiology Tests:  I have personally review the diagnostic cardiac testing and my interpretation is as follows:    EKG June 2023: Sinus rhythm, no ischemic changes  Echocardiogram: Normal left ventricular function with moderate to severe mitral valve regurgitation    Assessment/Plan     Paroxysmal atrial fibrillation:  Flecainide will be decreased to 100 mg twice daily. If she has no recurrences of atrial fibrillation over the next week, then she was advised to decrease the flecainide to 100 mg daily and then try to discontinue the medication. Pulmonary vein isolation was performed January 23, 2024, and she has had no symptomatic episodes of atrial fibrillation since undergoing the ablation procedure. No bleeding problems on warfarin.  The patient would like to consider Eliquis because of the difficulties in maintaining a stable INR.  A consultation with the clinical pharmacy will be obtained.  We will begin to taper the flecainide.  James C Ramicone, DO

## 2025-01-06 ENCOUNTER — OFFICE VISIT (OUTPATIENT)
Dept: CARDIOLOGY | Facility: CLINIC | Age: 70
End: 2025-01-06
Payer: MEDICARE

## 2025-01-06 VITALS
DIASTOLIC BLOOD PRESSURE: 84 MMHG | OXYGEN SATURATION: 98 % | HEIGHT: 66 IN | BODY MASS INDEX: 27.48 KG/M2 | HEART RATE: 63 BPM | SYSTOLIC BLOOD PRESSURE: 134 MMHG | WEIGHT: 171 LBS

## 2025-01-06 DIAGNOSIS — Z79.899 HIGH RISK MEDICATION USE: Primary | ICD-10-CM

## 2025-01-06 DIAGNOSIS — I48.0 PAROXYSMAL ATRIAL FIBRILLATION (MULTI): ICD-10-CM

## 2025-01-06 PROCEDURE — 3075F SYST BP GE 130 - 139MM HG: CPT | Performed by: INTERNAL MEDICINE

## 2025-01-06 PROCEDURE — 1159F MED LIST DOCD IN RCRD: CPT | Performed by: INTERNAL MEDICINE

## 2025-01-06 PROCEDURE — 1157F ADVNC CARE PLAN IN RCRD: CPT | Performed by: INTERNAL MEDICINE

## 2025-01-06 PROCEDURE — 3008F BODY MASS INDEX DOCD: CPT | Performed by: INTERNAL MEDICINE

## 2025-01-06 PROCEDURE — 3079F DIAST BP 80-89 MM HG: CPT | Performed by: INTERNAL MEDICINE

## 2025-01-06 PROCEDURE — 99214 OFFICE O/P EST MOD 30 MIN: CPT | Performed by: INTERNAL MEDICINE

## 2025-01-06 NOTE — ASSESSMENT & PLAN NOTE
Flecainide will be decreased to 100 mg twice daily.  If she has no recurrences of atrial fibrillation over the next week, then she was advised to decrease the flecainide to 100 mg daily and then try to discontinue the medication.  Pulmonary vein isolation was performed January 23, 2024, and she has had no symptomatic episodes of atrial fibrillation since undergoing the ablation procedure.

## 2025-01-06 NOTE — ASSESSMENT & PLAN NOTE
No bleeding problems on warfarin.  The patient would like to consider Eliquis because of the difficulties in maintaining a stable INR.  A consultation with the clinical pharmacy will be obtained.  We will begin to taper the flecainide.

## 2025-01-06 NOTE — PATIENT INSTRUCTIONS
Decrease Flecainide to 100 mg twice daily for one week.    If no episodes of Afib occur, then decrease Flecainide to 100 mg once daily.    Follow up with Dr. Ramicone in 1 year

## 2025-01-10 ENCOUNTER — ANTICOAGULATION - WARFARIN VISIT (OUTPATIENT)
Dept: CARDIOLOGY | Facility: CLINIC | Age: 70
End: 2025-01-10
Payer: MEDICARE

## 2025-01-10 DIAGNOSIS — Z79.01 LONG TERM (CURRENT) USE OF ANTICOAGULANTS: Primary | ICD-10-CM

## 2025-01-10 DIAGNOSIS — I48.0 PAROXYSMAL ATRIAL FIBRILLATION (MULTI): ICD-10-CM

## 2025-01-10 LAB
INR IN PPP BY COAGULATION ASSAY EXTERNAL: 1.7
PROTHROMBIN TIME (PT) IN PPP BY COAGULATION ASSAY EXTERNAL: NORMAL

## 2025-01-10 PROCEDURE — 99211 OFF/OP EST MAY X REQ PHY/QHP: CPT

## 2025-01-10 NOTE — PROGRESS NOTES
Patient identification verified with 2 identifiers.    Location: [facility] - [address] [place], [place] [address] [phone]     Referring Physician: Deep Nur MD  Enrollment/ Re-enrollment date: December 10, 2025   INR Goal: 2.0-3.0  INR monitoring is per [facility] protocol.  Anticoagulation Medication: warfarin  Indication: Atrial Fibrillation/Atrial Flutter    Subjective   Bleeding signs/symptoms: No    Bruising: No   Major bleeding event: No  Thrombosis signs/symptoms: No  Thromboembolic event: No  Missed doses: No  Denies  Extra doses: No  Medication changes: Yes  Denies  Dietary changes: No  Denies  Change in health: No  Change in activity: No  Alcohol: No  Other concerns: No    Upcoming Procedures:  Does the Patient Have any upcoming procedures that require interruption in anticoagulation therapy? no  Does the patient require bridging? no      Anticoagulation Summary  As of 1/10/2025      INR goal:  2.0-3.0   TTR:  --   INR used for dosin.70 (1/10/2025)   Weekly warfarin total:  18 mg               Assessment/Plan   Subtherapeutic     1. New dose: Will increase dose since this is the second week in a row patient is subtherapeutic.    2. Next INR: 1 week      Education provided to patient during the visit:  Patient instructed to call in interim with questions, concerns and changes.

## 2025-01-21 ENCOUNTER — ANTICOAGULATION - WARFARIN VISIT (OUTPATIENT)
Dept: CARDIOLOGY | Facility: CLINIC | Age: 70
End: 2025-01-21
Payer: MEDICARE

## 2025-01-21 DIAGNOSIS — I48.0 PAROXYSMAL ATRIAL FIBRILLATION (MULTI): ICD-10-CM

## 2025-01-21 DIAGNOSIS — Z79.01 LONG TERM (CURRENT) USE OF ANTICOAGULANTS: Primary | ICD-10-CM

## 2025-01-21 DIAGNOSIS — I48.91 ATRIAL FIBRILLATION, UNSPECIFIED TYPE (MULTI): ICD-10-CM

## 2025-01-21 LAB
POC INR: 2.4
POC PROTHROMBIN TIME: NORMAL

## 2025-01-21 PROCEDURE — 85610 PROTHROMBIN TIME: CPT | Mod: QW

## 2025-01-21 PROCEDURE — 99211 OFF/OP EST MAY X REQ PHY/QHP: CPT

## 2025-01-21 RX ORDER — WARFARIN 4 MG/1
TABLET ORAL
Qty: 55 TABLET | Refills: 1 | Status: SHIPPED | OUTPATIENT
Start: 2025-01-21 | End: 2026-01-21

## 2025-01-21 NOTE — PROGRESS NOTES
Patient identification verified with 2 identifiers.    Location: [facility] - [address] [place], [place] [id number] [phone]     Referring Physician: Deep Nur DO   Enrollment/ Re-enrollment date: 12/10/2025   INR Goal: 2.0-3.0  INR monitoring is per [facility] protocol.  Anticoagulation Medication: warfarin  Indication: Atrial Fibrillation/Atrial Flutter    Subjective   Bleeding signs/symptoms: No    Bruising: No   Major bleeding event: No  Thrombosis signs/symptoms: No  Thromboembolic event: No  Missed doses: Yes  pt took reduced dose yesterday  Extra doses: No  Medication changes: No  Dietary changes: No  Change in health: No  Change in activity: No  Alcohol: No  Other concerns: No    Upcoming Procedures:  Does the Patient Have any upcoming procedures that require interruption in anticoagulation therapy? no  Does the patient require bridging? no      Anticoagulation Summary  As of 2025      INR goal:  2.0-3.0   TTR:  78.6% (1.1 wk)   INR used for dosin.40 (2025)   Weekly warfarin total:  16 mg               Assessment/Plan   Therapeutic     1. New dose: no change    2. Next INR: 1 week      Education provided to patient during the visit:  Patient instructed to call in interim with questions, concerns and changes.

## 2025-01-28 ENCOUNTER — ANTICOAGULATION - WARFARIN VISIT (OUTPATIENT)
Dept: CARDIOLOGY | Facility: CLINIC | Age: 70
End: 2025-01-28
Payer: MEDICARE

## 2025-01-28 DIAGNOSIS — Z79.01 LONG TERM (CURRENT) USE OF ANTICOAGULANTS: Primary | ICD-10-CM

## 2025-01-28 DIAGNOSIS — I10 HYPERTENSION, BENIGN: ICD-10-CM

## 2025-01-28 DIAGNOSIS — I48.0 PAROXYSMAL ATRIAL FIBRILLATION (MULTI): ICD-10-CM

## 2025-01-28 LAB
POC INR: 2.3
POC PROTHROMBIN TIME: NORMAL

## 2025-01-28 PROCEDURE — 85610 PROTHROMBIN TIME: CPT | Mod: QW

## 2025-01-28 PROCEDURE — 99211 OFF/OP EST MAY X REQ PHY/QHP: CPT

## 2025-01-28 RX ORDER — LISINOPRIL AND HYDROCHLOROTHIAZIDE 12.5; 2 MG/1; MG/1
1 TABLET ORAL DAILY
Qty: 90 TABLET | Refills: 0 | Status: SHIPPED | OUTPATIENT
Start: 2025-01-28

## 2025-01-28 NOTE — PROGRESS NOTES
Patient identification verified with 2 identifiers.     Location: [facility] - [address] [place] [address] [address] [place], [place] [phone]      Referring Physician: Deep Nur MD  Enrollment/ Re-enrollment date: December 10, 2025   INR Goal: 2.0-3.0  INR monitoring is per [facility] protocol.  Anticoagulation Medication: warfarin  Indication: Atrial Fibrillation/Atrial Flutter  Patient has been on warfarin since 2018.  Patient transferring to the [facility] from the [facility] as patient has recently relocated to [facility].    Subjective   Bleeding signs/symptoms: No    Bruising: No   Major bleeding event: No  Thrombosis signs/symptoms: No  Thromboembolic event: No  Missed doses: No  Extra doses: No  Medication changes: No  Dietary changes: No  Change in health: No  Change in activity: No  Alcohol: No  Other concerns: No    Upcoming Procedures:  Does the Patient Have any upcoming procedures that require interruption in anticoagulation therapy? no  Does the patient require bridging? no      Anticoagulation Summary  As of 2025      INR goal:  2.0-3.0   TTR:  88.9% (2.1 wk)   INR used for dosin.30 (2025)   Weekly warfarin total:  16 mg               Assessment/Plan   Therapeutic     1. New dose: no change    2. Next INR: 2 weeks      Education provided to patient during the visit:  Patient instructed to call in interim with questions, concerns and changes.   Patient educated on interactions between medications and warfarin.   Patient educated on dietary consistency in vitamin k consumption.   Patient educated on affects of alcohol consumption while taking warfarin.   Patient educated on signs of bleeding/clotting.   Patient educated on compliance with dosing, follow up appointments, and prescribed plan of care.

## 2025-02-10 ENCOUNTER — ANTICOAGULATION - WARFARIN VISIT (OUTPATIENT)
Dept: CARDIOLOGY | Facility: CLINIC | Age: 70
End: 2025-02-10
Payer: MEDICARE

## 2025-02-10 DIAGNOSIS — I48.0 PAROXYSMAL ATRIAL FIBRILLATION (MULTI): ICD-10-CM

## 2025-02-10 DIAGNOSIS — Z79.01 LONG TERM (CURRENT) USE OF ANTICOAGULANTS: Primary | ICD-10-CM

## 2025-02-10 NOTE — PROGRESS NOTES
1416hrs:  Patient called and canceled POCT appointment scheduled for tomorrow due to illness.  Appointment rescheduled for Wednesday, February 12, 2025, per patient's request.

## 2025-02-11 ENCOUNTER — APPOINTMENT (OUTPATIENT)
Dept: CARDIOLOGY | Facility: CLINIC | Age: 70
End: 2025-02-11
Payer: MEDICARE

## 2025-02-12 ENCOUNTER — ANTICOAGULATION - WARFARIN VISIT (OUTPATIENT)
Dept: CARDIOLOGY | Facility: CLINIC | Age: 70
End: 2025-02-12
Payer: MEDICARE

## 2025-02-12 ENCOUNTER — APPOINTMENT (OUTPATIENT)
Dept: CARDIOLOGY | Facility: CLINIC | Age: 70
End: 2025-02-12
Payer: MEDICARE

## 2025-02-12 DIAGNOSIS — Z79.01 LONG TERM (CURRENT) USE OF ANTICOAGULANTS: Primary | ICD-10-CM

## 2025-02-12 DIAGNOSIS — I48.0 PAROXYSMAL ATRIAL FIBRILLATION (MULTI): ICD-10-CM

## 2025-02-19 ENCOUNTER — ANTICOAGULATION - WARFARIN VISIT (OUTPATIENT)
Dept: CARDIOLOGY | Facility: CLINIC | Age: 70
End: 2025-02-19
Payer: MEDICARE

## 2025-02-19 DIAGNOSIS — I48.0 PAROXYSMAL ATRIAL FIBRILLATION (MULTI): ICD-10-CM

## 2025-02-19 DIAGNOSIS — Z79.01 LONG TERM (CURRENT) USE OF ANTICOAGULANTS: Primary | ICD-10-CM

## 2025-02-19 LAB
POC INR: 2.4
POC PROTHROMBIN TIME: NORMAL

## 2025-02-19 PROCEDURE — 85610 PROTHROMBIN TIME: CPT | Mod: QW

## 2025-02-19 PROCEDURE — 99211 OFF/OP EST MAY X REQ PHY/QHP: CPT

## 2025-02-19 NOTE — PROGRESS NOTES
Patient identification verified with 2 identifiers.     Location: [facility] - [address] [place], [place] [id number] [phone]      Referring Physician: Deep Nur MD  Enrollment/ Re-enrollment date: December 10, 2025   INR Goal: 2.0-3.0  INR monitoring is per [facility] protocol.  Anticoagulation Medication: warfarin  Indication: Atrial Fibrillation/Atrial Flutter  Patient has been on warfarin since 2018.  Patient transferring to the [facility] from the [facility] as patient has recently relocated to [facility].    Subjective   Bleeding signs/symptoms: No    Bruising: No   Major bleeding event: No  Thrombosis signs/symptoms: No  Thromboembolic event: No  Missed doses: No  Extra doses: No  Medication changes: Yes  [place] HBP for cough  Dietary changes: No  Change in health: No  Change in activity: No  Alcohol: No  Other concerns: No    Upcoming Procedures:  Does the Patient Have any upcoming procedures that require interruption in anticoagulation therapy? no  Does the patient require bridging? no      Anticoagulation Summary  As of 2025      INR goal:  2.0-3.0   TTR:  95.4% (1.2 mo)   INR used for dosin.40 (2025)   Weekly warfarin total:  16 mg               Assessment/Plan   Therapeutic     1. New dose: no change    2. Next INR: 1 month      Education provided to patient during the visit:  Patient instructed to call in interim with questions, concerns and changes.   Patient educated on interactions between medications and warfarin.   Patient educated on dietary consistency in vitamin k consumption.   Patient educated on affects of alcohol consumption while taking warfarin.   Patient educated on signs of bleeding/clotting.

## 2025-02-25 DIAGNOSIS — I25.10 CORONARY ARTERY CALCIFICATION: ICD-10-CM

## 2025-02-25 RX ORDER — POTASSIUM CHLORIDE 20 MEQ/1
20 TABLET, EXTENDED RELEASE ORAL EVERY 12 HOURS
Qty: 180 TABLET | Refills: 3 | Status: SHIPPED | OUTPATIENT
Start: 2025-02-25 | End: 2026-02-25

## 2025-03-05 ENCOUNTER — ANTICOAGULATION - WARFARIN VISIT (OUTPATIENT)
Dept: CARDIOLOGY | Facility: CLINIC | Age: 70
End: 2025-03-05
Payer: MEDICARE

## 2025-03-05 DIAGNOSIS — I48.0 PAROXYSMAL ATRIAL FIBRILLATION (MULTI): ICD-10-CM

## 2025-03-05 DIAGNOSIS — Z79.01 LONG TERM (CURRENT) USE OF ANTICOAGULANTS: Primary | ICD-10-CM

## 2025-03-05 LAB
POC INR: 2.7
POC PROTHROMBIN TIME: NORMAL

## 2025-03-05 PROCEDURE — 99211 OFF/OP EST MAY X REQ PHY/QHP: CPT

## 2025-03-05 PROCEDURE — 85610 PROTHROMBIN TIME: CPT | Mod: QW

## 2025-03-05 NOTE — PROGRESS NOTES
Patient identification verified with 2 identifiers.     Location: [facility] - [address] [place] [address] [address] [place], [place] [id number] [phone]      Referring Physician: Deep Nur MD  Enrollment/ Re-enrollment date: December 10, 2025   INR Goal: 2.0-3.0  INR monitoring is per [facility] protocol.  Anticoagulation Medication: warfarin  Indication: Atrial Fibrillation/Atrial Flutter  Patient has been on warfarin since 2018.  Patient transferring to the [facility] from the [facility] as patient has recently relocated to [facility].    Subjective   Bleeding signs/symptoms: No    Bruising: No   Major bleeding event: No  Thrombosis signs/symptoms: No  Thromboembolic event: No  Missed doses: No  Extra doses: No  Medication changes: No  Dietary changes: No  Change in health: No  Change in activity: No  Alcohol: No  Other concerns: No    Upcoming Procedures:  Does the Patient Have any upcoming procedures that require interruption in anticoagulation therapy? no  Does the patient require bridging? no      Anticoagulation Summary  As of 3/5/2025      INR goal:  2.0-3.0   TTR:  96.7% (1.7 mo)   INR used for dosin.70 (3/5/2025)   Weekly warfarin total:  16 mg               Assessment/Plan   Therapeutic     1. New dose: no change    2. Next INR: 1 month      Education provided to patient during the visit:  Patient instructed to call in interim with questions, concerns and changes.   Patient educated on interactions between medications and warfarin.   Patient educated on dietary consistency in vitamin k consumption.   Patient educated on affects of alcohol consumption while taking warfarin.   Patient educated on signs of bleeding/clotting.   Patient educated on compliance with dosing, follow up appointments, and prescribed plan of care.

## 2025-03-25 DIAGNOSIS — I48.0 PAROXYSMAL ATRIAL FIBRILLATION (MULTI): ICD-10-CM

## 2025-03-25 RX ORDER — METOPROLOL SUCCINATE 50 MG/1
50 TABLET, EXTENDED RELEASE ORAL DAILY
Qty: 90 TABLET | Refills: 2 | Status: SHIPPED | OUTPATIENT
Start: 2025-03-25

## 2025-04-02 ENCOUNTER — ANTICOAGULATION - WARFARIN VISIT (OUTPATIENT)
Dept: CARDIOLOGY | Facility: CLINIC | Age: 70
End: 2025-04-02
Payer: MEDICARE

## 2025-04-02 ENCOUNTER — APPOINTMENT (OUTPATIENT)
Dept: CARDIOLOGY | Facility: CLINIC | Age: 70
End: 2025-04-02
Payer: MEDICARE

## 2025-04-02 DIAGNOSIS — Z79.01 LONG TERM (CURRENT) USE OF ANTICOAGULANTS: Primary | ICD-10-CM

## 2025-04-02 DIAGNOSIS — I48.0 PAROXYSMAL ATRIAL FIBRILLATION (MULTI): ICD-10-CM

## 2025-04-08 ENCOUNTER — ANTICOAGULATION - WARFARIN VISIT (OUTPATIENT)
Dept: CARDIOLOGY | Facility: CLINIC | Age: 70
End: 2025-04-08
Payer: MEDICARE

## 2025-04-08 DIAGNOSIS — Z79.01 LONG TERM (CURRENT) USE OF ANTICOAGULANTS: Primary | ICD-10-CM

## 2025-04-08 DIAGNOSIS — I48.0 PAROXYSMAL ATRIAL FIBRILLATION (MULTI): ICD-10-CM

## 2025-04-08 LAB
POC INR: 2.3
POC PROTHROMBIN TIME: NORMAL

## 2025-04-08 PROCEDURE — 85610 PROTHROMBIN TIME: CPT | Mod: QW

## 2025-04-08 PROCEDURE — 99211 OFF/OP EST MAY X REQ PHY/QHP: CPT

## 2025-04-08 NOTE — PROGRESS NOTES
Patient identification verified with 2 identifiers.     Location: [facility] - [address] [place], [place] [id number] [phone]      Referring Physician: Deep Nur MD  Enrollment/ Re-enrollment date: December 10, 2025   INR Goal: 2.0-3.0  INR monitoring is per [facility] protocol.  Anticoagulation Medication: warfarin  Indication: Atrial Fibrillation/Atrial Flutter  Patient has been on warfarin since 2018.  Patient transferring to the [facility] from the [facility] as patient has recently relocated to [facility].    Subjective   Bleeding signs/symptoms: No    Bruising: No   Major bleeding event: No  Thrombosis signs/symptoms: No  Thromboembolic event: No  Missed doses: No  Extra doses: No  Medication changes: Yes  Patient had been taking TYLENOL/PRN for pain; patient stopped this OTC medication 3 or 4 days ago.  Dietary changes: No  Change in health: Yes  Patient recently sustained a back injury.  Change in activity: No  Alcohol: No  Other concerns: No    Upcoming Procedures:  Does the Patient Have any upcoming procedures that require interruption in anticoagulation therapy? no  Does the patient require bridging? no      Anticoagulation Summary  As of 2025      INR goal:  2.0-3.0   TTR:  98.0% (2.8 mo)   INR used for dosin.30 (2025)   Weekly warfarin total:  16 mg               Assessment/Plan   Therapeutic     1. New dose: no change    2. Next INR: 1 month      Education provided to patient during the visit:  Patient instructed to call in interim with questions, concerns and changes.   Patient educated on interactions between medications and warfarin.   Patient educated on dietary consistency in vitamin k consumption.   Patient educated on affects of alcohol consumption while taking warfarin.   Patient educated on signs of bleeding/clotting.   Patient educated on compliance with dosing, follow up appointments, and prescribed plan of care.

## 2025-04-13 NOTE — PROGRESS NOTES
"    History Of Present Illness:      This is a 69-year-old female with atrial fibrillation.  She presents for a follow-up evaluation.  The patient underwent pulmonary vein isolation January 23, 2024, and reports no episodes of atrial fibrillation since the ablation procedure.  The patient has been off flecainide, but is experiencing brief episodes of atrial fibrillation.  Overall the AF burden has been low since the ablation but she is beginning to feel the episodes again.     Past medical history:     Paroxysmal atrial fibrillation  Obstructive sleep apnea  Hypertension  History of back surgery    Review of Systems  Other review of systems negative  Last Recorded Vitals:      7/1/2024    11:10 AM 7/1/2024    11:36 AM 12/3/2024     9:27 AM 12/4/2024    10:36 AM 1/2/2025    11:23 AM 1/2/2025    11:35 AM 1/6/2025    11:17 AM   Vitals   Systolic 138 138 140  140 145 134   Diastolic 82 85 78  82 80 84   BP Location Left arm  Left arm  Right arm  Left arm   Heart Rate 70    63  63   Temp   36.4 °C (97.6 °F)       Height 1.676 m (5' 6\")  1.676 m (5' 6\") 1.676 m (5' 6\")   1.676 m (5' 6\")   Weight (lb) 186  175.8  173  171   BMI 30.02 kg/m2  28.37 kg/m2 28.37 kg/m2 27.92 kg/m2  27.6 kg/m2   BSA (m2) 1.98 m2  1.93 m2 1.93 m2 1.91 m2  1.9 m2   Visit Report Report Report Report  Report Report Report     Allergies:  Penicillins, Latex, Phenobarbital, Sulfa (sulfonamide antibiotics), Adhesive, and Neomycin-bacitracin-polymyxin  Outpatient Medications:  Current Outpatient Medications   Medication Instructions    ascorbic acid (Vitamin C) 500 mg tablet 1 tablet, Daily    cholecalciferol, vitamin D3, 250 mcg (10,000 unit) capsule 1 capsule, Daily    cyanocobalamin (Vitamin B-12) 100 mcg tablet 1 tablet, Daily    flecainide (TAMBOCOR) 150 mg, oral, 2 times daily    gabapentin (NEURONTIN) 300 mg, oral, 2 times daily    Lactobacillus acidophilus (PROBIOTIC ORAL) 1 tablet, Daily    lisinopriL-hydrochlorothiazide 20-12.5 mg tablet 1 " tablet, oral, Daily    lisinopril 20 mg, oral, Daily    magnesium oxide 500 mg tablet 1 tablet, Daily    melatonin 5 mg, Nightly    metoprolol succinate XL (TOPROL-XL) 50 mg, oral, Daily, Do not crush or chew.    multivitamin tablet 1 tablet, Daily    potassium chloride CR 20 mEq ER tablet 20 mEq, oral, Every 12 hours    simvastatin (ZOCOR) 10 mg, oral, Daily    warfarin (Jantoven) 4 mg tablet Take 4 mg (1 tablet) every Friday and 2 mg (1/2 tablet) all other days or as directed by the anticoagulation clinic.    zinc sulfate (Zincate) 220 (50 Zn) MG capsule 1 capsule, Daily       Physical Exam:    General Appearance:  Alert, oriented, no distress  Skin:  Warm and dry  Head and Neck:  No elevation of JVP, no carotid bruits  Cardiac Exam:  Rhythm is regular, S1 and S2 are normal, no murmur S3 or S4  Lungs:  Clear to auscultation  Extremities:  no edema  Neurologic:  No focal deficits  Psychiatric:  Appropriate mood and behavior    Lab Results:    CMP:  Recent Labs     12/03/24  0950 01/12/24  1139 11/30/23  1556 11/09/23  1201 11/15/22  1009 11/11/21  1025 11/10/20  1019 06/11/20  1004    141 145 140 141 143 141 140   K 4.2 4.1 4.3 4.4 4.1 4.3 4.3 4.2    104 105 103 103 105 103 105   CO2 28 25 27 28 27 27 28 26   ANIONGAP 13 16 17 13 15 15 14 13   BUN 13 17 15 15 15 13 13 12   CREATININE 0.64 0.71 0.66 0.78 0.62 0.66 0.65 0.70   EGFR >90 >90 >90 82  --   --   --   --    MG  --  1.66  --  1.69  --   --   --   --      Recent Labs     12/03/24  0950 01/12/24  1139 11/30/23  1556 11/09/23  1201 11/15/22  1009   ALBUMIN 4.4 4.4 4.5 4.7 4.7   ALKPHOS 86 82 82 90 85   ALT 23 32 34 40 41   AST 24 30 31 37 41*   BILITOT 0.6 0.4 0.5 0.5 0.4     CBC:  Recent Labs     12/03/24  0950 01/12/24  1139 11/30/23  1556 11/09/23  1201 11/15/22  1009 11/11/21  1025 11/10/20  1019 11/05/19  1041   WBC 11.7* 13.6* 11.1 14.6* 10.2 9.5 8.7 9.1   HGB 14.9 15.9 14.7 17.0* 15.1 14.2 14.4 14.6   HCT 45.9 47.8* 46.5* 51.1* 48.4* 46.9*  47.0* 46.3*    282 266 342 277 273 263 297   MCV 90 90 92 91 95 96 97 94     COAG:   Recent Labs     04/08/25  1505 03/05/25  1034 02/19/25  1034 01/28/25  1013 01/21/25  0000 01/10/25  0000 01/03/25  0908 12/05/24  0000   INR 2.30 2.70 2.40 2.30 2.40 1.70 1.50 3.00     Cardiology Tests (personally reviewed):    EKG June 2023: Sinus rhythm, no ischemic changes  Echocardiogram: Normal left ventricular function with moderate to severe mitral valve regurgitation    Assessment/Plan   Problem List Items Addressed This Visit             ICD-10-CM    Paroxysmal atrial fibrillation (Multi) I48.0     Overall the AF burden has been much lower following the pulmonary vein isolation procedure which was performed January 23, 2024, and the patient has been off flecainide for several months.  However, recently she has noticed an increase in AF episodes but the episodes have been brief.  In view of this we will resume flecainide at 100 mg twice daily for AF suppression.  We will consider Holter monitor if necessary.         Relevant Medications    flecainide (Tambocor) 100 mg tablet    Other Relevant Orders    ECG 12 lead (Clinic Performed) (Completed)    High risk medication use - Primary Z79.899     No bleeding problems on warfarin.  Flecainide will be resumed at 100 mg twice daily.          James C Ramicone, DO

## 2025-04-14 ENCOUNTER — OFFICE VISIT (OUTPATIENT)
Dept: CARDIOLOGY | Facility: CLINIC | Age: 70
End: 2025-04-14
Payer: MEDICARE

## 2025-04-14 VITALS
DIASTOLIC BLOOD PRESSURE: 90 MMHG | SYSTOLIC BLOOD PRESSURE: 140 MMHG | HEIGHT: 66 IN | OXYGEN SATURATION: 95 % | BODY MASS INDEX: 28.61 KG/M2 | HEART RATE: 78 BPM | WEIGHT: 178 LBS

## 2025-04-14 DIAGNOSIS — I48.0 PAROXYSMAL ATRIAL FIBRILLATION (MULTI): ICD-10-CM

## 2025-04-14 DIAGNOSIS — Z79.899 HIGH RISK MEDICATION USE: Primary | ICD-10-CM

## 2025-04-14 PROCEDURE — 3080F DIAST BP >= 90 MM HG: CPT | Performed by: INTERNAL MEDICINE

## 2025-04-14 PROCEDURE — 3077F SYST BP >= 140 MM HG: CPT | Performed by: INTERNAL MEDICINE

## 2025-04-14 PROCEDURE — 99214 OFFICE O/P EST MOD 30 MIN: CPT | Performed by: INTERNAL MEDICINE

## 2025-04-14 PROCEDURE — 1157F ADVNC CARE PLAN IN RCRD: CPT | Performed by: INTERNAL MEDICINE

## 2025-04-14 PROCEDURE — 1159F MED LIST DOCD IN RCRD: CPT | Performed by: INTERNAL MEDICINE

## 2025-04-14 PROCEDURE — 3008F BODY MASS INDEX DOCD: CPT | Performed by: INTERNAL MEDICINE

## 2025-04-14 PROCEDURE — 93005 ELECTROCARDIOGRAM TRACING: CPT | Performed by: INTERNAL MEDICINE

## 2025-04-14 RX ORDER — FLECAINIDE ACETATE 100 MG/1
100 TABLET ORAL 2 TIMES DAILY
Status: SHIPPED
Start: 2025-04-14 | End: 2026-04-14

## 2025-04-14 NOTE — ASSESSMENT & PLAN NOTE
Overall the AF burden has been much lower following the pulmonary vein isolation procedure which was performed January 23, 2024, and the patient has been off flecainide for several months.  However, recently she has noticed an increase in AF episodes but the episodes have been brief.  In view of this we will resume flecainide at 100 mg twice daily for AF suppression.  We will consider Holter monitor if necessary.

## 2025-04-15 ENCOUNTER — TELEPHONE (OUTPATIENT)
Dept: PRIMARY CARE | Facility: CLINIC | Age: 70
End: 2025-04-15

## 2025-04-15 ENCOUNTER — OFFICE VISIT (OUTPATIENT)
Dept: PRIMARY CARE | Facility: CLINIC | Age: 70
End: 2025-04-15
Payer: MEDICARE

## 2025-04-15 VITALS
TEMPERATURE: 97.8 F | HEIGHT: 66 IN | OXYGEN SATURATION: 99 % | WEIGHT: 177 LBS | DIASTOLIC BLOOD PRESSURE: 80 MMHG | BODY MASS INDEX: 28.45 KG/M2 | SYSTOLIC BLOOD PRESSURE: 118 MMHG | HEART RATE: 78 BPM

## 2025-04-15 DIAGNOSIS — R31.9 HEMATURIA, UNSPECIFIED TYPE: ICD-10-CM

## 2025-04-15 DIAGNOSIS — G89.29 CHRONIC RIGHT-SIDED THORACIC BACK PAIN: Primary | ICD-10-CM

## 2025-04-15 DIAGNOSIS — M62.838 MUSCLE SPASM: ICD-10-CM

## 2025-04-15 DIAGNOSIS — M54.6 CHRONIC RIGHT-SIDED THORACIC BACK PAIN: Primary | ICD-10-CM

## 2025-04-15 LAB
POC APPEARANCE, URINE: CLEAR
POC BILIRUBIN, URINE: NEGATIVE
POC BLOOD, URINE: ABNORMAL
POC COLOR, URINE: YELLOW
POC GLUCOSE, URINE: NEGATIVE MG/DL
POC KETONES, URINE: NEGATIVE MG/DL
POC LEUKOCYTES, URINE: NEGATIVE
POC NITRITE,URINE: NEGATIVE
POC PH, URINE: 6.5 PH
POC PROTEIN, URINE: NEGATIVE MG/DL
POC SPECIFIC GRAVITY, URINE: 1.02
POC UROBILINOGEN, URINE: 0.2 EU/DL

## 2025-04-15 PROCEDURE — 81003 URINALYSIS AUTO W/O SCOPE: CPT | Performed by: NURSE PRACTITIONER

## 2025-04-15 PROCEDURE — 3074F SYST BP LT 130 MM HG: CPT | Performed by: NURSE PRACTITIONER

## 2025-04-15 PROCEDURE — 99214 OFFICE O/P EST MOD 30 MIN: CPT | Performed by: NURSE PRACTITIONER

## 2025-04-15 PROCEDURE — 3079F DIAST BP 80-89 MM HG: CPT | Performed by: NURSE PRACTITIONER

## 2025-04-15 PROCEDURE — 3008F BODY MASS INDEX DOCD: CPT | Performed by: NURSE PRACTITIONER

## 2025-04-15 PROCEDURE — 1157F ADVNC CARE PLAN IN RCRD: CPT | Performed by: NURSE PRACTITIONER

## 2025-04-15 PROCEDURE — 1125F AMNT PAIN NOTED PAIN PRSNT: CPT | Performed by: NURSE PRACTITIONER

## 2025-04-15 RX ORDER — BACLOFEN 10 MG/1
10 TABLET ORAL 2 TIMES DAILY PRN
Qty: 30 TABLET | Refills: 1 | Status: SHIPPED | OUTPATIENT
Start: 2025-04-15 | End: 2025-10-12

## 2025-04-15 ASSESSMENT — ENCOUNTER SYMPTOMS
BACK PAIN: 1
CONSTITUTIONAL NEGATIVE: 1
GASTROINTESTINAL NEGATIVE: 1
CARDIOVASCULAR NEGATIVE: 1

## 2025-04-15 ASSESSMENT — PAIN SCALES - GENERAL: PAINLEVEL_OUTOF10: 3

## 2025-04-15 NOTE — PATIENT INSTRUCTIONS
Diagnosed with low back pain likely musculoskeletal related.  Your Urine test is negative-except a trace of blood. We will send for a urine culture.   Please take Baclofen twice daily as needed for muscle spasms.  Heat Packs to back no longer than 20 minutes on and remove a few times a day.  Follow-up with Dr. James Franklin if no improvement.

## 2025-04-15 NOTE — PROGRESS NOTES
"Subjective   Patient ID: Savita Buchanan is a 69 y.o. female who presents for Back Pain (Back pain for the past 8 days ).    Back Pain       Patient of Dr. James Franklin presents to clinic for evaluation of mid back pain  and  muscle spasms x8 days.  Patient complains of pain in the right mid back that radiates around to the front.  Patient denies any specific trauma or injury but states she has been very busy working around her house painting.    Patient states pain is worse when lying down on the right side.  She denies any urinary symptoms fevers abdominal pains.    Patient has used heating pad and IcyHot without relief of symptoms.      Review of Systems   Constitutional: Negative.    Cardiovascular: Negative.    Gastrointestinal: Negative.    Genitourinary: Negative.    Musculoskeletal:  Positive for back pain.       Objective   /80 (BP Location: Left arm, Patient Position: Sitting, BP Cuff Size: Adult)   Pulse 78   Temp 36.6 °C (97.8 °F) (Temporal)   Ht 1.676 m (5' 6\")   Wt 80.3 kg (177 lb)   LMP  (LMP Unknown)   SpO2 99%   BMI 28.57 kg/m²     Physical Exam  Vitals reviewed.   Constitutional:       Appearance: Normal appearance.   Cardiovascular:      Rate and Rhythm: Normal rate and regular rhythm.   Pulmonary:      Effort: Pulmonary effort is normal.      Breath sounds: Normal breath sounds.   Abdominal:      Tenderness: There is no abdominal tenderness. There is no right CVA tenderness or left CVA tenderness.   Musculoskeletal:         General: Normal range of motion.      Comments: Normal back flexion and extension.  Gait steady.    Skin:     General: Skin is warm and dry.   Neurological:      Mental Status: She is alert and oriented to person, place, and time.         Assessment/Plan   Diagnoses and all orders for this visit:  Chronic right-sided thoracic back pain  -     POCT UA Automated manually resulted  -     baclofen (Lioresal) 10 mg tablet; Take 1 tablet (10 mg) by mouth 2 times a " day as needed for muscle spasms.  - Heating pad as needed.  Muscle spasm  -     baclofen (Lioresal) 10 mg tablet; Take 1 tablet (10 mg) by mouth 2 times a day as needed for muscle spasms.  -Follow-up if no improvement.  Hematuria, unspecified type  -     Urine Culture

## 2025-04-15 NOTE — TELEPHONE ENCOUNTER
Pt had injury to back, has had pain for 1 week. Pt can't get in to see Baron freeman the 29th, wants to know if she can see you for a sooner appointment.     Pts #[phone]

## 2025-04-17 LAB — BACTERIA UR CULT: NORMAL

## 2025-04-20 LAB
P OFFSET: 168 MS
P ONSET: 142 MS
Q ONSET: 230 MS
QRS COUNT: 13 BEATS
QRS DURATION: 66 MS
QT INTERVAL: 374 MS
QTC CALCULATION(BAZETT): 445 MS
QTC FREDERICIA: 420 MS
R AXIS: -16 DEGREES
T AXIS: 5 DEGREES
T OFFSET: 417 MS
VENTRICULAR RATE: 85 BPM

## 2025-04-24 DIAGNOSIS — I10 HYPERTENSION, BENIGN: ICD-10-CM

## 2025-04-24 RX ORDER — LISINOPRIL AND HYDROCHLOROTHIAZIDE 12.5; 2 MG/1; MG/1
1 TABLET ORAL DAILY
Qty: 90 TABLET | Refills: 0 | Status: SHIPPED | OUTPATIENT
Start: 2025-04-24

## 2025-04-29 ENCOUNTER — APPOINTMENT (OUTPATIENT)
Dept: PRIMARY CARE | Facility: CLINIC | Age: 70
End: 2025-04-29
Payer: MEDICARE

## 2025-05-05 ENCOUNTER — TELEPHONE (OUTPATIENT)
Dept: PRIMARY CARE | Facility: CLINIC | Age: 70
End: 2025-05-05
Payer: MEDICARE

## 2025-05-05 NOTE — TELEPHONE ENCOUNTER
Pt went to [facility] on 4/15 for her back-pulled muscle & put on muscle  Relaxers.  She is not better.  Hard time walking.  Please advise  Ty  Lg [phone]    PenicillinsHives, Shortness of breath  LatexHives  PhenobarbitalHives  Sulfa (Sulfonamide Antibiotics)Hives  AdhesiveRash  Neomycin-bacitracin-polymyxinRash

## 2025-05-06 ENCOUNTER — ANTICOAGULATION - WARFARIN VISIT (OUTPATIENT)
Dept: CARDIOLOGY | Facility: CLINIC | Age: 70
End: 2025-05-06
Payer: MEDICARE

## 2025-05-06 DIAGNOSIS — Z79.01 LONG TERM (CURRENT) USE OF ANTICOAGULANTS: Primary | ICD-10-CM

## 2025-05-06 DIAGNOSIS — I48.0 PAROXYSMAL ATRIAL FIBRILLATION (MULTI): ICD-10-CM

## 2025-05-06 LAB
POC INR: 2.2 (ref 0.9–1.1)
POC PROTHROMBIN TIME: ABNORMAL (ref 9.3–12.5)

## 2025-05-06 PROCEDURE — 85610 PROTHROMBIN TIME: CPT | Mod: QW

## 2025-05-06 PROCEDURE — 99211 OFF/OP EST MAY X REQ PHY/QHP: CPT

## 2025-05-06 NOTE — PROGRESS NOTES
Patient identification verified with 2 identifiers.     Location: [facility] - [address] [place], [place] [id number] [phone]      Referring Physician: Deep Nur MD  Enrollment/ Re-enrollment date: December 10, 2025   INR Goal: 2.0-3.0  INR monitoring is per [facility] protocol.  Anticoagulation Medication: warfarin  Indication: Atrial Fibrillation/Atrial Flutter    Patient has been on warfarin since 2018.  Patient transferring to the [facility] from the [facility] as patient has recently relocated to [facility].    Subjective   Bleeding signs/symptoms: No    Bruising: No   Major bleeding event: No  Thrombosis signs/symptoms: No  Thromboembolic event: No  Missed doses: No  Extra doses: No  Medication changes: Yes  Patient resumed taking FLECAINIDE three weeks ago.  Patient also taking FLEXARIL regularly.  Patient also started on a course of PREDNISONE yesterday.  Patient was prescribed a four day course of steroidal therapy.  Dietary changes: No  Change in health: No  Change in activity: No  Alcohol: No  Other concerns: yes - Patient evaluated in the [facility] yesterday for further evaluation of back pain/muscle spasms.     Upcoming Procedures:  Does the Patient Have any upcoming procedures that require interruption in anticoagulation therapy? no  Does the patient require bridging? no      Anticoagulation Summary  As of 5/6/2025      INR goal:  2.0-3.0   TTR:  98.0% (2.8 mo)   INR used for dosing:  --               Assessment/Plan   Therapeutic     1. New dose: no change    2. Next INR: will RTC on Friday, May 9, 2025, given recent initiation of steroidal therapy and other changes in medication regimen.  .       Education provided to patient during the visit:  Patient instructed to call in interim with questions, concerns and changes.   Patient educated on interactions between medications and warfarin.   Patient educated on dietary consistency in vitamin k consumption.    Patient educated on affects of alcohol consumption while taking warfarin.   Patient educated on signs of bleeding/clotting.   Patient educated on compliance with dosing, follow up appointments, and prescribed plan of care.

## 2025-05-09 ENCOUNTER — ANTICOAGULATION - WARFARIN VISIT (OUTPATIENT)
Dept: CARDIOLOGY | Facility: CLINIC | Age: 70
End: 2025-05-09
Payer: MEDICARE

## 2025-05-09 DIAGNOSIS — I48.0 PAROXYSMAL ATRIAL FIBRILLATION (MULTI): ICD-10-CM

## 2025-05-09 DIAGNOSIS — Z79.01 LONG TERM (CURRENT) USE OF ANTICOAGULANTS: Primary | ICD-10-CM

## 2025-05-09 LAB
POC INR: 2.6 (ref 0.9–1.1)
POC PROTHROMBIN TIME: ABNORMAL (ref 9.3–12.5)

## 2025-05-09 PROCEDURE — 85610 PROTHROMBIN TIME: CPT | Mod: QW

## 2025-05-09 PROCEDURE — 99211 OFF/OP EST MAY X REQ PHY/QHP: CPT

## 2025-05-09 NOTE — PROGRESS NOTES
Patient identification verified with 2 identifiers.     Location: [facility] - [address] [place], [place] [id number] [phone]      Referring Physician: Deep Nur MD  Enrollment/ Re-enrollment date: December 10, 2025   INR Goal: 2.0-3.0  INR monitoring is per [facility] protocol.  Anticoagulation Medication: warfarin  Indication: Atrial Fibrillation/Atrial Flutter    Patient has been on warfarin since 2018.  Patient transferring to the [facility] from the [facility] as patient has recently relocated to [facility].    Subjective   Bleeding signs/symptoms: No    Bruising: No   Major bleeding event: No  Thrombosis signs/symptoms: No  Thromboembolic event: No  Missed doses: No  Extra doses: No  Medication changes: Yes  last dose of prednisone today, plans to decrease Baclofen to once daily  Dietary changes: No  Change in health: No  Change in activity: No  Alcohol: No  Other concerns:     Upcoming Procedures:  Does the Patient Have any upcoming procedures that require interruption in anticoagulation therapy? no  Does the patient require bridging? no      Anticoagulation Summary  As of 2025      INR goal:  2.0-3.0   TTR:  98.5% (3.9 mo)   INR used for dosin.60 (2025)   Weekly warfarin total:  16 mg               Assessment/Plan   Therapeutic  Maintain weekly dose  RTC in 2 weeks    Education provided to patient during the visit:  Patient instructed to call in interim with questions, concerns and changes.   Patient educated on interactions between medications and warfarin.   Patient educated on dietary consistency in vitamin k consumption.   Patient educated on affects of alcohol consumption while taking warfarin.   Patient educated on signs of bleeding/clotting.   Patient educated on compliance with dosing, follow up appointments, and prescribed plan of care.

## 2025-05-20 ENCOUNTER — TELEPHONE (OUTPATIENT)
Dept: PRIMARY CARE | Facility: CLINIC | Age: 70
End: 2025-05-20
Payer: MEDICARE

## 2025-05-20 ENCOUNTER — ANTICOAGULATION - WARFARIN VISIT (OUTPATIENT)
Dept: CARDIOLOGY | Facility: CLINIC | Age: 70
End: 2025-05-20
Payer: MEDICARE

## 2025-05-20 DIAGNOSIS — Z79.01 LONG TERM (CURRENT) USE OF ANTICOAGULANTS: Primary | ICD-10-CM

## 2025-05-20 DIAGNOSIS — I48.0 PAROXYSMAL ATRIAL FIBRILLATION (MULTI): ICD-10-CM
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
## 2025-05-23 ENCOUNTER — ANTICOAGULATION - WARFARIN VISIT (OUTPATIENT)
Dept: CARDIOLOGY | Facility: CLINIC | Age: 70
End: 2025-05-23
Payer: MEDICARE

## 2025-05-23 DIAGNOSIS — Z79.01 LONG TERM (CURRENT) USE OF ANTICOAGULANTS: Primary | ICD-10-CM

## 2025-05-23 DIAGNOSIS — I48.0 PAROXYSMAL ATRIAL FIBRILLATION (MULTI): ICD-10-CM

## 2025-05-23 LAB
POC INR: 3.8 (ref 0.9–1.1)
POC PROTHROMBIN TIME: ABNORMAL (ref 9.3–12.5)

## 2025-05-23 PROCEDURE — 99211 OFF/OP EST MAY X REQ PHY/QHP: CPT

## 2025-05-23 PROCEDURE — 85610 PROTHROMBIN TIME: CPT | Mod: QW

## 2025-05-23 NOTE — PROGRESS NOTES
Patient identification verified with 2 identifiers.    Location: [facility] - [address] [place] [address] [address] [place], [place] [id number] [phone]     Referring Physician: Deep Nur DO   Enrollment/ Re-enrollment date: 12/10/2025   INR Goal: 2.0-3.0  INR monitoring is per [facility] protocol.  Anticoagulation Medication: warfarin  Indication: Atrial Fibrillation/Atrial Flutter    Subjective   Bleeding signs/symptoms: No    Bruising: No   Major bleeding event: No  Thrombosis signs/symptoms: No  Thromboembolic event: No  Missed doses: No  Extra doses: No  Medication changes: No  Dietary changes: No  Change in health: No  Change in activity: No  Alcohol: No  Other concerns: No    Upcoming Procedures:  Does the Patient Have any upcoming procedures that require interruption in anticoagulation therapy? no  Does the patient require bridging? no      Anticoagulation Summary  As of 5/23/2025      INR goal:  2.0-3.0   TTR:  91.5% (4.3 mo)   INR used for dosing:  3.80 (5/23/2025)   Weekly warfarin total:  16 mg               Assessment/Plan   Supratherapeutic     1. New dose: HOLD TODAY, REDUCE DOSE ON FRIDAYS TO 2 MG    2. Next INR: 1 week      Education provided to patient during the visit:  Patient instructed to call in interim with questions, concerns and changes.

## 2025-05-27 ENCOUNTER — APPOINTMENT (OUTPATIENT)
Dept: PRIMARY CARE | Facility: CLINIC | Age: 70
End: 2025-05-27
Payer: MEDICARE

## 2025-05-27 ENCOUNTER — HOSPITAL ENCOUNTER (OUTPATIENT)
Dept: RADIOLOGY | Facility: CLINIC | Age: 70
Discharge: HOME | End: 2025-05-27
Payer: MEDICARE

## 2025-05-27 VITALS
SYSTOLIC BLOOD PRESSURE: 146 MMHG | BODY MASS INDEX: 27.97 KG/M2 | DIASTOLIC BLOOD PRESSURE: 79 MMHG | OXYGEN SATURATION: 97 % | HEART RATE: 72 BPM | HEIGHT: 66 IN | TEMPERATURE: 98.2 F | WEIGHT: 174 LBS

## 2025-05-27 DIAGNOSIS — M48.061 SPINAL STENOSIS OF LUMBAR REGION WITHOUT NEUROGENIC CLAUDICATION: ICD-10-CM

## 2025-05-27 DIAGNOSIS — S39.012D STRAIN OF LUMBAR REGION, SUBSEQUENT ENCOUNTER: ICD-10-CM

## 2025-05-27 DIAGNOSIS — I10 HYPERTENSION, BENIGN: ICD-10-CM

## 2025-05-27 DIAGNOSIS — E78.5 HYPERLIPIDEMIA, UNSPECIFIED HYPERLIPIDEMIA TYPE: Primary | ICD-10-CM

## 2025-05-27 DIAGNOSIS — D33.4 BENIGN NEOPLASM OF SPINAL CORD (MULTI): ICD-10-CM

## 2025-05-27 PROCEDURE — 1159F MED LIST DOCD IN RCRD: CPT | Performed by: FAMILY MEDICINE

## 2025-05-27 PROCEDURE — G2211 COMPLEX E/M VISIT ADD ON: HCPCS | Performed by: FAMILY MEDICINE

## 2025-05-27 PROCEDURE — 1160F RVW MEDS BY RX/DR IN RCRD: CPT | Performed by: FAMILY MEDICINE

## 2025-05-27 PROCEDURE — 3008F BODY MASS INDEX DOCD: CPT | Performed by: FAMILY MEDICINE

## 2025-05-27 PROCEDURE — 3078F DIAST BP <80 MM HG: CPT | Performed by: FAMILY MEDICINE

## 2025-05-27 PROCEDURE — 72100 X-RAY EXAM L-S SPINE 2/3 VWS: CPT | Performed by: RADIOLOGY

## 2025-05-27 PROCEDURE — 72100 X-RAY EXAM L-S SPINE 2/3 VWS: CPT

## 2025-05-27 PROCEDURE — 3077F SYST BP >= 140 MM HG: CPT | Performed by: FAMILY MEDICINE

## 2025-05-27 PROCEDURE — 99214 OFFICE O/P EST MOD 30 MIN: CPT | Performed by: FAMILY MEDICINE

## 2025-05-27 PROCEDURE — 1125F AMNT PAIN NOTED PAIN PRSNT: CPT | Performed by: FAMILY MEDICINE

## 2025-05-27 RX ORDER — TIZANIDINE 4 MG/1
4 TABLET ORAL
Qty: 30 TABLET | Refills: 1 | Status: SHIPPED | OUTPATIENT
Start: 2025-05-27 | End: 2025-06-06

## 2025-05-27 RX ORDER — PREDNISONE 10 MG/1
TABLET ORAL
Qty: 30 TABLET | Refills: 0 | Status: SHIPPED | OUTPATIENT
Start: 2025-05-27

## 2025-05-27 ASSESSMENT — PAIN SCALES - GENERAL: PAINLEVEL_OUTOF10: 6

## 2025-05-27 ASSESSMENT — ENCOUNTER SYMPTOMS
CONSTITUTIONAL NEGATIVE: 1
PSYCHIATRIC NEGATIVE: 1
BACK PAIN: 1

## 2025-05-27 NOTE — PROGRESS NOTES
"Subjective   Patient ID: Savita Buchanan is a 69 y.o. female who presents for c/o (Lower back issues x 6wks).    HPI     Review of Systems   Constitutional: Negative.    Musculoskeletal:  Positive for back pain.   Psychiatric/Behavioral: Negative.         Objective   /79 (BP Location: Left arm)   Pulse 72   Temp 36.8 °C (98.2 °F) (Oral)   Ht 1.676 m (5' 6\")   Wt 78.9 kg (174 lb)   LMP  (LMP Unknown)   SpO2 97%   BMI 28.08 kg/m²     Physical Exam  Vitals and nursing note reviewed.   Constitutional:       Appearance: Normal appearance.   Musculoskeletal:      Comments: Right lumbar paravertebral muscle spasm and tenderness deep tendon reflexes and extensor houses function are normal.   Neurological:      Mental Status: She is alert.         Assessment/Plan patient seen here for some persistent lumbar pain.  We are sending her physical therapy starting her on a slow steroids taper with a muscle relaxer.  We are getting x-rays of her lumbar spine today.  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

## 2025-05-30 ENCOUNTER — ANTICOAGULATION - WARFARIN VISIT (OUTPATIENT)
Dept: CARDIOLOGY | Facility: CLINIC | Age: 70
End: 2025-05-30
Payer: MEDICARE

## 2025-05-30 DIAGNOSIS — Z79.01 LONG TERM (CURRENT) USE OF ANTICOAGULANTS: Primary | ICD-10-CM

## 2025-05-30 DIAGNOSIS — I48.0 PAROXYSMAL ATRIAL FIBRILLATION (MULTI): ICD-10-CM

## 2025-05-30 LAB
POC INR: 2.5 (ref 0.9–1.1)
POC PROTHROMBIN TIME: ABNORMAL (ref 9.3–12.5)

## 2025-05-30 PROCEDURE — 85610 PROTHROMBIN TIME: CPT | Mod: QW

## 2025-05-30 PROCEDURE — 99211 OFF/OP EST MAY X REQ PHY/QHP: CPT

## 2025-05-30 NOTE — PROGRESS NOTES
Patient identification verified with 2 identifiers.     Location: [facility] - [address] [place] [address] Dr. Wade, [place] [id number] [phone]      Referring Physician: Deep Nur MD  Enrollment/ Re-enrollment date: December 10, 2025   INR Goal: 2.0-3.0  INR monitoring is per [facility] protocol.  Anticoagulation Medication: warfarin  Indication: Atrial Fibrillation/Atrial Flutter     Patient has been on warfarin since 2018.  Patient transferring to the [facility] from the [facility] as patient has recently relocated to [facility].    Subjective   Bleeding signs/symptoms: No    Bruising: No   Major bleeding event: No  Thrombosis signs/symptoms: No  Thromboembolic event: No  Missed doses: No  Extra doses: No  Medication changes: Yes  Patient currently taking predniSONE (Deltasone) 10mg tablet TAPER and tiZANidine (Zanaflex) 4mg tablet/BID.  Patient will remain on steroidal therapy for the next two weeks.  Dietary changes: No  Change in health: No  Change in activity: No  Alcohol: No  Other concerns: No    Upcoming Procedures:  Does the Patient Have any upcoming procedures that require interruption in anticoagulation therapy? no  Does the patient require bridging? no    ONE dose of warfarin was HELD and TWD was REDUCED at time of last appointment.      Anticoagulation Summary  As of 2025      INR goal:  2.0-3.0   TTR:  88.8% (4.6 mo)   INR used for dosin.50 (2025)   Weekly warfarin total:  16 mg               Assessment/Plan   Therapeutic     1. New dose: no change    2. Next INR: 1 week      Education provided to patient during the visit:  Patient instructed to call in interim with questions, concerns and changes.   Patient educated on interactions between medications and warfarin.   Patient educated on dietary consistency in vitamin k consumption.   Patient educated on affects of alcohol consumption while taking warfarin.   Patient educated on signs of  bleeding/clotting.   Patient educated on compliance with dosing, follow up appointments, and prescribed plan of care.

## 2025-06-03 ENCOUNTER — APPOINTMENT (OUTPATIENT)
Dept: PRIMARY CARE | Facility: CLINIC | Age: 70
End: 2025-06-03
Payer: MEDICARE

## 2025-06-03 ENCOUNTER — APPOINTMENT (OUTPATIENT)
Dept: CARDIOLOGY | Facility: CLINIC | Age: 70
End: 2025-06-03
Payer: MEDICARE

## 2025-06-04 ENCOUNTER — ANTICOAGULATION - WARFARIN VISIT (OUTPATIENT)
Dept: CARDIOLOGY | Facility: CLINIC | Age: 70
End: 2025-06-04
Payer: MEDICARE

## 2025-06-04 DIAGNOSIS — I48.0 PAROXYSMAL ATRIAL FIBRILLATION (MULTI): ICD-10-CM

## 2025-06-04 DIAGNOSIS — Z79.01 LONG TERM (CURRENT) USE OF ANTICOAGULANTS: Primary | ICD-10-CM

## 2025-06-04 PROBLEM — S39.012A ACUTE LUMBAR MYOFASCIAL STRAIN: Status: ACTIVE | Noted: 2025-05-05

## 2025-06-04 PROBLEM — M62.830 MUSCLE SPASM OF BACK: Status: ACTIVE | Noted: 2025-05-05

## 2025-06-04 PROBLEM — S29.019A ACUTE THORACIC MYOFASCIAL STRAIN: Status: ACTIVE | Noted: 2025-05-05

## 2025-06-04 LAB
POC INR: 2.2 (ref 0.9–1.1)
POC PROTHROMBIN TIME: ABNORMAL (ref 9.3–12.5)

## 2025-06-04 PROCEDURE — 99211 OFF/OP EST MAY X REQ PHY/QHP: CPT

## 2025-06-04 PROCEDURE — 85610 PROTHROMBIN TIME: CPT | Mod: QW

## 2025-06-04 NOTE — PROGRESS NOTES
Patient identification verified with 2 identifiers.    Location: [facility] - [address], [place] [id number] [phone]     Referring Physician: Deep Nur DO   Enrollment/ Re-enrollment date: 12/10/2025   INR Goal: 2.0-3.0  INR monitoring is per [facility] protocol.  Anticoagulation Medication: warfarin  Indication: Atrial Fibrillation/Atrial Flutter    Subjective   Bleeding signs/symptoms: No    Bruising: No   Major bleeding event: No  Thrombosis signs/symptoms: No  Thromboembolic event: No  Missed doses: No  Extra doses: No  Medication changes: No  Dietary changes: No  Change in health: No  Change in activity: No  Alcohol: No  Other concerns: No    Upcoming Procedures:  Does the Patient Have any upcoming procedures that require interruption in anticoagulation therapy? no  Does the patient require bridging? no      Anticoagulation Summary  As of 2025      INR goal:  2.0-3.0   TTR:  89.2% (4.7 mo)   INR used for dosin.20 (2025)   Weekly warfarin total:  16 mg               Assessment/Plan   Therapeutic     1. New dose: no change    2. Next INR: 1 week      Education provided to patient during the visit:  Patient instructed to call in interim with questions, concerns and changes.

## 2025-06-09 DIAGNOSIS — G62.9 NEUROPATHY: ICD-10-CM

## 2025-06-09 RX ORDER — GABAPENTIN 300 MG/1
300 CAPSULE ORAL 2 TIMES DAILY
Qty: 180 CAPSULE | Refills: 0 | Status: SHIPPED | OUTPATIENT
Start: 2025-06-09

## 2025-06-11 PROBLEM — M48.061 SPINAL STENOSIS OF LUMBAR REGION WITHOUT NEUROGENIC CLAUDICATION: Status: ACTIVE | Noted: 2025-06-11

## 2025-06-11 PROBLEM — S39.012A STRAIN OF LUMBAR REGION: Status: ACTIVE | Noted: 2025-06-11

## 2025-06-11 NOTE — PROGRESS NOTES
PHYSICAL THERAPY EVALUATION AND TREATMENT    Patient Name: Savita Buchanan  MRN: [id number]  Today's Date: 6/12/2025  Time Calculation  Start Time: 1045  Stop Time: 1124  Time Calculation (min): 39 min    PT Evaluation Time Entry  PT Evaluation (Low) Time Entry: 26  PT Therapeutic Procedures Time Entry  Therapeutic Activity Time Entry: 13                   Insurance:  Visit number: 1 (updated 06/12/25)  Insurance Type: Payor: MEDICARE / Plan: MEDICARE PART A AND B / Product Type: *No Product type* / $0 APPLIED   Authorization or Plan of Care date Range: 6/12/25 - 9/10/25  Surgery: Ablation A-fib Cryo Cpt 28139, 1/23/2024.  Days since surgery: 506   Referred by: James Franklin DO     Assessment:  PT Assessment  PT Assessment Results: Decreased strength, Decreased range of motion, Pain  Rehab Prognosis: Good  Evaluation/Treatment Tolerance: Patient tolerated treatment well  Barriers to Participation: Insight into problems, Premorbid level of function     Savita Buchanan  is a 69 y.o. old patient who participated in a physical therapy evaluation today due to mid/low back pain following a muscle strain. Jamins impairments include: postural deficits, pain, decreased strength, decreased range of motion, and decreased functional mobility.   Due to these impairments, she has the following functional limitations and participation restrictions: difficulty with ambulation, difficulty with stair negotiation, difficulty performing household activities, difficulty performing recreational activities, difficulty with sleeping, difficulty with completing/performing some ADLs/IADLs, and increased time to complete ADLs/IADLs. Jamins clinical presentation is Stable and/or uncomplicated characteristics with the level of complexity being low. Jamins potential for rehab is good.  Skilled physical therapy services are appropriate and beneficial in order to achieve measurable and meaningful change in the objective tests and  measures. Utilization of skilled physical therapy services will aid in advancing her functional status and attaining her therapy-related goals. Savita verbalized understanding and is in agreement with all goals and plan of care.  Savita left session with all questions answered and no increase in symptoms.      Plan:  OP PT Plan  Treatment/Interventions: Aquatic therapy, Biofeedback, Cryotherapy, Dry needling, Education/ Instruction, Electrical stimulation, Gait training, Hot pack, Manual therapy, Neuromuscular re-education, Self care/ home management, Therapeutic activities, Therapeutic exercises  PT Plan: Skilled PT  PT Frequency: 1 time per week  Duration: 6 weeks  Certification Period Start Date: 06/12/25  Certification Period End Date: 09/10/25  Rehab Potential: Good  Plan of Care Agreement: Patient    NV: Initiate strengthening HEP. Manual therapy for pain management.    Therapy Diagnosis:   Problem List Items Addressed This Visit           ICD-10-CM    Strain of lumbar region - Primary S39.012A    Relevant Orders    Follow Up In Physical Therapy    Spinal stenosis of lumbar region without neurogenic claudication M48.061    Relevant Orders    Follow Up In Physical Therapy       Subjective    Savita Buchanan  is a 69 y.o. female  presenting to the clinic with chief complaint of painful and tight muscle strain in R low back.    Onset: 8 weeks    JOSE ARMANDO: vacuuming, recently moved homes and has been cleaning both homes frequently    Pain location: R mid/low back  Pain description:  achy, sharp, dull  10/10 at worst; 2/10 at best    Worse with:  laying on L side, performing household activities, navigating steps, walking, riding in car  Better with:  sitting in recliner with heating pad    Confirms N/T in R anterior thigh following back surgery in 2004   Confirms radiating pain up the back and across the mid-section    Prior treatments/interventions:  prednisone, muscle relaxers    Home: Lives with  in 1 story  "home, 0 [place]  PLOF: IND  CLOF: IND but ADLs/iADLs cause pain  Pt's goal: \" to be pain-free and have mobility\"     Work: retired  Exercise: no  Hobbies: n/a    Diagnostic images:   5/27/25 X-RAY of Lumbar Spine - Osteopenia is present. End-plate sclerosis and spur formation is seen throughout the lumbar spine. Facet hypertrophy is identified. Disc space narrowing is seen throughout the lumbar spine. Grade 1 retrolisthesis of L2 on L3 is noted. No acute fracture or dislocation is seen.    Medical History Form: Reviewed (scanned into chart)    Precautions:  Fall Risk: None    + HTN, medically managed; + blood thinner medication; +latex allergy; + afib  Denies: CA, pacemaker, DM, epilepsy/seizures, or other known cardio/neuro/pulmonary problems   Denies unexpected weight loss/gain, night sweats, or night pain.    Previous surgeries include: back surgery 2004, heart ablation in 2024    Objective   Outcome Measures:    GERBER: 13/50    Posture: shoulder rounded forward, head forward, and kyphotic  Transfers: IND  Bed Mobility: IND but causes pain. Poor tolerance to lying flat on her back and lying prone.  Gait: Pt ambulates IND with no assistive device. Demos slow isma    Dermatomes/Sensation BLE:  Mid-Anterior Thigh (L2) L: Intact, R: Impaired  Medial Knee (L3) L: Intact, R: Impaired  Medial Malleolus (L4) L: Intact, R: Intact  Dorsal Second Toe Web Space (L5) L: Intact, R: Intact  Lateral Malleolus (S1) L: Intact, R: Intact  Popliteal Fossa (S2) L: Intact, R: Intact     Myotomes/Strength (MMT in sitting):  Hip Flex (L2) L: 4- , R: 4-  Hip Add L: 4+ , R: 4+  Hip Abd L: 4+ , R: 4+  Knee Ext (L3) L: 5 , R: 5  Knee Flex L: 4+ , R: 4+  Great Toe Extension (L5) L: 4 , R: 4  Ankle DF L: 4+ , R: 4+  Ankle PF (S1) L: 4+ , R: 4+    Lumbar ROM: in standing via goniometer  Flexion: 96  Extension: 17 with pain through range  Side-bending L/R: 25 with pain on R side / 22 with pain on R side  Rotation L/R: no limitation / no " limitation    Repeated Movements: NT    Hip PROM:  Hip Flexion L/R: WNL / WNL  Hip Extension L/R: NT / NT  Hip ER L/R: WFL but caused pain in R low back / WFL  Hip IR L/R: limited / limited    Special Tests:  Slump Test:  L negative and R negative  Straight Leg Raise: L negative and R negative  Prone Instability Test: NT    Palpation:  Segment Mobility Assessment: unable to assess due to pt's inability/poor tolerance to lying prone  Pain provocation: unable to assess due to pt's inability/poor tolerance to lying prone  Muscle tension: tenderness to palpation and minimal muscle tension observed in R lumbar paraspinals and lateral musculature    Observation:  Aberrant Movements: none observed    Treatments:    Assigned HEP- N/A    Therapeutic Activity: 13 minutes  Reviewed low back school in full    EDUCATION:  Outpatient Education  Individual(s) Educated: Patient  Education Provided: Anatomy, Body Mechanics, Physiology, POC, Posture  Risk and Benefits Discussed with Patient/Caregiver/Other: yes  Patient/Caregiver Demonstrated Understanding: yes  Plan of Care Discussed and Agreed Upon: yes  Patient Response to Education: Patient/Caregiver Verbalized Understanding of Information, Patient/Caregiver Performed Return Demonstration of Exercises/Activities, Patient/Caregiver Asked Appropriate Questions  -Educated Savita  on the role of PT and PT POC  -Educated Savita regarding benefit and purpose of skilled PT services along with results of examination findings and how this correlates to their chief complaint.   -Answered Savita's questions in full.  -Educated Savita on relevant anatomy and the rationale for exercises  -Savita Buchanan advised to hold any ex if it increases pain, Savita Buchanan verbalized understanding    Goals:    STG's (within 2 weeks)  Savita Buchanan will decrease pain by >/= 2/10 points from baseline to improve ability to perform household/recreational activities.    Savita Buchanan will be able  to sleep through the night with less than 2 interruptions due to pain in order to improve mental and physical well-being in order to safely participate in ADLs.     Savita Buchanan will demonstrate independence,  excellent understanding of and report compliance with at least 3 exercises in her HEP to facilitate the learning process to maximize PT benefits and to facilitate independent rehab program upon discharge.    LTG's (by discharge)    Savita Buchanan will report 50% reduction in tenderness to palpation to indicate healing and to improve ADLs, functional mobility, and other household and recreational activities.    Savita Buchanan will improve Oswestry (GERBER) score by at least 12 points (minimally clinically important difference) in order to reflect improvement in ADL's/pain reduction.   Oswestry (GERBER) baseline 6/12/2025: 13/50  (an GERBER score of =20% (</=10/50) represents minimal to no disability)    Savita Buchanan will demonstrate good posture with <2 cues for correction during 45 minute treatment session showing knowledge of appropriate spinal alignment to decrease excess strain through the spine and allow for increased ability to perform ADLs, IADLs, functional mobility, and other household/recreational activities.    Savita Buchanan will demonstrate appropriate body mechanics with <2 cues for correction during 45 minute treatment session in order to enhance body mechanics and protect the integrity of lumbar spine during ADLs, functional mobility, and household duties.    Savita Buchanan will demonstrate pain-free ROM in all planes of lumbar spinal motion to allow for work activities and household chores with minimal to no pain, difficulty, or modifications    Savita Buchanan will be able to perform all aspects of bed mobility independently without pain or increase in time to complete.    Savita Buchanan will IND ambulate on even surfaces without distance restriction, increase in pain, major deviation or  instability to improve participation in ADLs, functional mobility, and other household and recreational activities.    Savita Buchanan will report ability to IND ambulate in community for > or =1 hours with no pain, compensations, or modifications in order to improve her capacity for work and completing activities around her home.     Savita Gibsonn to increase core/B LE strength in deficient muscles to >/= 4/5 to improve dynamic stability during ADLs, functional mobility, and other household/recreational activities and to assist in pt's ability to complete work tasks and household chores with minimal to no difficulty or modifications.    Savita Buchanan will decrease pain to 0-2/10 to improve ability to perform household/recreational activities.    Savita Buchanan will be able to sleep through the night without waking due to pain in order to improve mental and physical well-being in order to safely participate in ADLs.     Savita Buchanan will demonstrate independence,  excellent understanding of and report compliance with HEP to facilitate the learning process to maximize PT benefits and to facilitate independent rehab program upon discharge.

## 2025-06-12 ENCOUNTER — EVALUATION (OUTPATIENT)
Dept: PHYSICAL THERAPY | Facility: CLINIC | Age: 70
End: 2025-06-12
Payer: MEDICARE

## 2025-06-12 DIAGNOSIS — S39.012D STRAIN OF LUMBAR REGION, SUBSEQUENT ENCOUNTER: Primary | ICD-10-CM

## 2025-06-12 DIAGNOSIS — M48.061 SPINAL STENOSIS OF LUMBAR REGION WITHOUT NEUROGENIC CLAUDICATION: ICD-10-CM

## 2025-06-12 PROCEDURE — 97161 PT EVAL LOW COMPLEX 20 MIN: CPT | Mod: GP

## 2025-06-12 PROCEDURE — 97530 THERAPEUTIC ACTIVITIES: CPT | Mod: GP

## 2025-06-12 ASSESSMENT — PATIENT HEALTH QUESTIONNAIRE - PHQ9
1. LITTLE INTEREST OR PLEASURE IN DOING THINGS: NOT AT ALL
2. FEELING DOWN, DEPRESSED OR HOPELESS: NOT AT ALL
SUM OF ALL RESPONSES TO PHQ9 QUESTIONS 1 AND 2: 0

## 2025-06-12 ASSESSMENT — COLUMBIA-SUICIDE SEVERITY RATING SCALE - C-SSRS
2. HAVE YOU ACTUALLY HAD ANY THOUGHTS OF KILLING YOURSELF?: NO
1. IN THE PAST MONTH, HAVE YOU WISHED YOU WERE DEAD OR WISHED YOU COULD GO TO SLEEP AND NOT WAKE UP?: NO
6. HAVE YOU EVER DONE ANYTHING, STARTED TO DO ANYTHING, OR PREPARED TO DO ANYTHING TO END YOUR LIFE?: NO

## 2025-06-12 ASSESSMENT — ENCOUNTER SYMPTOMS
DEPRESSION: 0
LOSS OF SENSATION IN FEET: 0
OCCASIONAL FEELINGS OF UNSTEADINESS: 0

## 2025-06-13 ENCOUNTER — ANTICOAGULATION - WARFARIN VISIT (OUTPATIENT)
Dept: CARDIOLOGY | Facility: CLINIC | Age: 70
End: 2025-06-13
Payer: MEDICARE

## 2025-06-13 DIAGNOSIS — I48.0 PAROXYSMAL ATRIAL FIBRILLATION (MULTI): ICD-10-CM

## 2025-06-13 DIAGNOSIS — Z79.01 LONG TERM (CURRENT) USE OF ANTICOAGULANTS: Primary | ICD-10-CM

## 2025-06-13 LAB
POC INR: 2.8 (ref 0.9–1.1)
POC PROTHROMBIN TIME: ABNORMAL (ref 9.3–12.5)

## 2025-06-13 PROCEDURE — 85610 PROTHROMBIN TIME: CPT | Mod: QW

## 2025-06-13 PROCEDURE — 99211 OFF/OP EST MAY X REQ PHY/QHP: CPT

## 2025-06-13 NOTE — PROGRESS NOTES
Patient identification verified with 2 identifiers.     Location: [facility] - [address] [place], [place] [id number] [phone]      Referring Physician: Deep Nur MD  Enrollment/ Re-enrollment date: December 10, 2025   INR Goal: 2.0-3.0  INR monitoring is per [facility] protocol.  Anticoagulation Medication: warfarin  Indication: Atrial Fibrillation/Atrial Flutter     Patient has been on warfarin since 2018.  Patient transferring to the [facility] from the [facility] as patient has recently relocated to [facility].    Subjective   Bleeding signs/symptoms: No    Bruising: No   Major bleeding event: No  Thrombosis signs/symptoms: No  Thromboembolic event: No  Missed doses: No  Extra doses: No  Medication changes: Yes  Patient has completed steroidal therapy.  Dietary changes: No  Change in health: No  Change in activity: No  Alcohol: No  Other concerns: No    Upcoming Procedures:  Does the Patient Have any upcoming procedures that require interruption in anticoagulation therapy? no  Does the patient require bridging? no      Anticoagulation Summary  As of 2025      INR goal:  2.0-3.0   TTR:  89.9% (5 mo)   INR used for dosin.80 (2025)   Weekly warfarin total:  14 mg               Assessment/Plan   Therapeutic   Patient has been taking 2mg of warfarin everyday.  1. New dose: no change  - will MAINTAIN TWD of warfarin: 14mg  2. Next INR: 7-10 days      Education provided to patient during the visit:  Patient instructed to call in interim with questions, concerns and changes.   Patient educated on interactions between medications and warfarin.   Patient educated on dietary consistency in vitamin k consumption.   Patient educated on affects of alcohol consumption while taking warfarin.   Patient educated on signs of bleeding/clotting.   Patient educated on compliance with dosing, follow up appointments, and prescribed plan of care.

## 2025-06-18 NOTE — PROGRESS NOTES
Physical Therapy    Physical Therapy Treatment    Patient Name: Savita Buchanan  MRN: [id number]  Today's Date: 6/20/2025  Time Calculation  Start Time: 1138  Stop Time: 1218  Time Calculation (min): 40 min    Current Problem  Problem List Items Addressed This Visit           ICD-10-CM    Strain of lumbar region - Primary S39.012A    Spinal stenosis of lumbar region without neurogenic claudication M48.061       Insurance - reviewed:  Visit number: 2 (updated 06/20/25)  Insurance Type: Payor: MEDICARE / Plan: MEDICARE PART A AND B / Product Type: *No Product type* /   Authorization or Plan of Care date Range: 6/12/25 - 9/10/25   Surgery: Ablation A-fib Cryo Cpt 10175, 1/23/2024.  Days since surgery: 514   Referred by: James Franklin DO     Precautions: Fall Risk: None   ALLERGIC TO MASSAGE CREAM - NO NOT USE   + HTN, medically managed; + blood thinner medication; +latex allergy; + afib  Denies: CA, pacemaker, DM, epilepsy/seizures, or other known cardio/neuro/pulmonary problems   Denies unexpected weight loss/gain, night sweats, or night pain.     Previous surgeries include: back surgery 2004, heart ablation in 2024       General  Subjective      Savita Buchanan denies any falls or complications since last session. Savita Buchanan reports pain woke her up last night at 3 in the morning when sleeping on her R side. Transitioned to sleeping in her recliner with use of heating pad with better results. Pain intensity/frequency has decreased since last session but pain/muscle spasms still occur frequently often with no warning.     Pain:  0/10  Pain location: n/a      Objective     Treatments:  Abbreviation Key  NP = not performed this visit   NV = next visit  // = parallel    Assigned HEP- Med[employer] [id number]    Therapeutic Exercise:  25 minutes    Attempted the NuStep for warm-up but after 1 min but pt reported an increase in low back pain  1x10 seated pelvic tilts (unable to complete in hooklying due to discomfort  in this position)  2x10 each LE seated marches + pelvic tilt  2x10 seated dead bugs (added to HEP)  3x10 seated hip abduction against green TB (added to HEP)  2x10 seated hip adduction against pillow + pelvic tilt (added to HEP)    Manual Therapy:  15 minutes   Pt seated laying over pillows: myofascial cupping and STM to R paraspinals (learned pt is allergic to an ingredient in massage cream. DO NOT USE in future sessions)      Outpatient Education: Home exercise program instructed and issued. Answered questions about home exercise program. Provided manual cues for correct performance of exercises. Provided verbal feedback to improve exercise technique.   Savita Buchanan verbalizes understanding of all education provided: Yes    Assessment:  Savita Buchanan completed treatment today which focused upon manual therapy and core in order to address R lumbar paraspinal strain.  Savita presents with poor tolerance to STM to the R paraspinals, frequently flinching with deeper pressure. Minimal to moderate muscle tension observed in this area.  Savita requires performing exercises in the seated position due to poor tolerance to lying on her back or her stomach.  Savita demonstrated good activation of deep abdominal musculature during exercises, requiring minimal cueing for proper exercise technique.  Savita's  response to tx was: Patient tolerated therapeutic interventions well and without any adverse events. Improved independence with home exercises. Savita's Progress towards goals: Reduced intensity of pain. Reduced pain level. Savita Buchanan continues to have decreased strength, decreased ROM, and pain limiting participation in household chores, recreational activities, exercise, sleep, attending medical appointments, and shopping within the community Further skilled therapy services are warranted to address the remaining impairments in order to progress towards functional goals. Savita left session with all  questions answered and no increase in symptoms.      Plan: Progress core and hip strengthening to melvin. Postural strengthening to melvin. Manual therapy only if strain is less irritable (NO MYOFASCIAL CUPPING OR USE OF MASSAGE CREAM). Dry needling?      Time Calculation  Start Time: 1138  Stop Time: 1218  Time Calculation (min): 40 min     PT Therapeutic Procedures Time Entry  Therapeutic Exercise Time Entry: 25  Manual Therapy Time Entry: 15

## 2025-06-20 ENCOUNTER — TREATMENT (OUTPATIENT)
Dept: PHYSICAL THERAPY | Facility: CLINIC | Age: 70
End: 2025-06-20
Payer: MEDICARE

## 2025-06-20 DIAGNOSIS — M48.061 SPINAL STENOSIS OF LUMBAR REGION WITHOUT NEUROGENIC CLAUDICATION: ICD-10-CM

## 2025-06-20 DIAGNOSIS — S39.012D STRAIN OF LUMBAR REGION, SUBSEQUENT ENCOUNTER: Primary | ICD-10-CM

## 2025-06-20 PROCEDURE — 97110 THERAPEUTIC EXERCISES: CPT | Mod: GP

## 2025-06-20 PROCEDURE — 97140 MANUAL THERAPY 1/> REGIONS: CPT | Mod: GP

## 2025-06-24 ENCOUNTER — TREATMENT (OUTPATIENT)
Dept: PHYSICAL THERAPY | Facility: CLINIC | Age: 70
End: 2025-06-24
Payer: MEDICARE

## 2025-06-24 DIAGNOSIS — S39.012D STRAIN OF LUMBAR REGION, SUBSEQUENT ENCOUNTER: Primary | ICD-10-CM

## 2025-06-24 DIAGNOSIS — M48.061 SPINAL STENOSIS OF LUMBAR REGION WITHOUT NEUROGENIC CLAUDICATION: ICD-10-CM

## 2025-06-24 PROCEDURE — 97140 MANUAL THERAPY 1/> REGIONS: CPT | Mod: GP | Performed by: PHYSICAL THERAPIST

## 2025-06-24 PROCEDURE — 97110 THERAPEUTIC EXERCISES: CPT | Mod: GP | Performed by: PHYSICAL THERAPIST

## 2025-06-24 NOTE — PROGRESS NOTES
Physical Therapy      Patient Name: Savita Buchanan  MRN: [id number]  Today's Date: 6/24/2025  Time Calculation  Start Time: 1030  Stop Time: 1115  Time Calculation (min): 45 min    Current Problem  Problem List Items Addressed This Visit           ICD-10-CM    Strain of lumbar region - Primary S39.012A    Spinal stenosis of lumbar region without neurogenic claudication M48.061       Insurance - reviewed:  Visit number: 3 (updated 06/24/25)  Insurance Type: Payor: MEDICARE / Plan: MEDICARE PART A AND B / Product Type: *No Product type* /   Authorization or Plan of Care date Range: 6/12/25 - 9/10/25   Surgery: Ablation A-fib Cryo Cpt 90059, 1/23/2024.  Days since surgery: 518   Referred by: James Franklin DO     Precautions: Fall Risk: None   ALLERGIC TO MASSAGE CREAM - NO NOT USE ;   osteopenia lumbar spine;   grade 1 retrolisthesis of L2 on L3  + HTN, medically managed; + blood thinner medication; +latex allergy; + afib  Previous surgeries include: back surgery 2004, heart ablation in 2024       Subjective    Savita Buchanan denies any falls or complications since last session. Savita Buchanan reports pain continues despite performing hep.   Has to sleep on right side, cannot tolerate laying on left.        Pain:  2/10 at best;  10/10 at worst.   Sharp, achy, dull  Pain location:   right side of back located T9-T11 region  HEP:  fair compliance due to being busy, no issues when she does do them      Objective   R mid anterior thigh sensation impaired - due to back surgery 2004  Right sided convexity noted at T09-T11 region when performing trunk flexion  Palpation T9-T10 rib R is painful.  Intercostals painful.        Treatments:  Assigned HEP- Medbridge [id number]    Therapeutic Exercise:  25 minutes    Attempted the NuStep for warm-up but after 1 min but pt reported an increase in low back pain  1x10 seated pelvic tilts (unable to complete in hooklying due to discomfort in this position)  2x10 each LE seated  "marches + pelvic tilt  2x10 seated dead bugs (added to HEP)  3x10 seated hip abduction against green TB (added to HEP)  2x10 seated hip adduction against pillow + pelvic tilt (added to HEP)  Testing exercises:  SGR, SBL, RFIS, MARCIA, RFISit, Rextsit;  R s/l w/ left thoracic rotation (worse);  L s/l w/ right thoracic rotation (Better)  Seated thoracic right rotation x 10    Manual Therapy:  20 minutes   Pt seated laying over pillows: myofascial cupping and STM to R paraspinals (learned pt is allergic to an ingredient in massage cream. DO NOT USE in future sessions)  Seated with left side leaning and forward on mat:  right T8-T10 intercostal  gentle massage  Skin intact following treatment.      Outpatient Education: Home exercise program instructed and issued. Answered questions about home exercise program. Provided manual cues for correct performance of exercises. Provided verbal feedback to improve exercise technique.   Savita Buchanan verbalizes understanding of all education provided: Yes    Assessment:  Savita Buchanan demonstrated no change in her right sided symptoms with lumbar repeated motions in multiple planes.   Her complaints are centered around T9-10 rib cage and intercostals.   Left sidelying with right thoracic rotation initially abolished pain at end range but pain returns upon returning to neutral spine positioning.   Manual demonstrated most tenderness around T9-10 ribs.   Patient has a hx of osteopenia and no diagnostic studies were performed on her rib cage.  Most likely her pain is related to rib positioning due to convexity noted in this region when bending over.   Patient noted no abnormal convexities noted in her past medical hx.    She also reported that the ED MD noted a rib \"out of alignment.\"    Recommend focus on rib positioning w/ ther ex and poss diagnostic study to r/o any rib abnormalities.   Discussed findings w/ evaluating PT post tx.      Savita's  response to tx was: Patient " tolerated therapeutic interventions well and without any adverse events. Improved independence with home exercises. Savita's Progress towards goals: Reduced intensity of pain. Reduced pain level. Savita Buchanan continues to have decreased strength, decreased ROM, and pain limiting participation in household chores, recreational activities, exercise, sleep, attending medical appointments, and shopping within the community Further skilled therapy services are warranted to address the remaining impairments in order to progress towards functional goals. Savita left session with all questions answered and no increase in symptoms.      Plan:   Progress core and hip strengthening to melvin.   Postural strengthening to melvin.   Manual therapy only if strain is less irritable (NO MYOFASCIAL CUPPING OR USE OF MASSAGE CREAM). Dry needling?      Time Calculation  Start Time: 1030  Stop Time: 1115  Time Calculation (min): 45 min     PT Therapeutic Procedures Time Entry  Therapeutic Exercise Time Entry: 25  Manual Therapy Time Entry: 20

## 2025-06-25 ENCOUNTER — ANTICOAGULATION - WARFARIN VISIT (OUTPATIENT)
Dept: CARDIOLOGY | Facility: CLINIC | Age: 70
End: 2025-06-25
Payer: MEDICARE

## 2025-06-25 DIAGNOSIS — Z79.01 LONG TERM (CURRENT) USE OF ANTICOAGULANTS: Primary | ICD-10-CM

## 2025-06-25 DIAGNOSIS — I48.0 PAROXYSMAL ATRIAL FIBRILLATION (MULTI): ICD-10-CM

## 2025-06-25 LAB
POC INR: 2.1 (ref 0.9–1.1)
POC PROTHROMBIN TIME: ABNORMAL (ref 9.3–12.5)

## 2025-06-25 PROCEDURE — 85610 PROTHROMBIN TIME: CPT | Mod: QW

## 2025-06-25 PROCEDURE — 99211 OFF/OP EST MAY X REQ PHY/QHP: CPT

## 2025-06-25 NOTE — PROGRESS NOTES
Patient identification verified with 2 identifiers.     Location: [facility] - [address] [place] [address] [address]. [address], [place] [id number] [phone]      Referring Physician: Deep Nur MD  Enrollment/ Re-enrollment date: December 10, 2025   INR Goal: 2.0-3.0  INR monitoring is per [facility] protocol.  Anticoagulation Medication: warfarin  Indication: Atrial Fibrillation/Atrial Flutter     Patient has been on warfarin since 2018.  Patient transferring to the [facility] from the [facility] as patient has recently relocated to [facility].    Subjective   Bleeding signs/symptoms: No    Bruising: No   Major bleeding event: No  Thrombosis signs/symptoms: No  Thromboembolic event: No  Missed doses: No  Extra doses: No  Medication changes: No  Dietary changes: No  Change in health: No  Change in activity: No  Alcohol: No  Other concerns: No    Upcoming Procedures:  Does the Patient Have any upcoming procedures that require interruption in anticoagulation therapy? no  Does the patient require bridging? no      Anticoagulation Summary  As of 2025      INR goal:  2.0-3.0   TTR:  90.6% (5.4 mo)   INR used for dosin.10 (2025)   Weekly warfarin total:  14 mg               Assessment/Plan   Therapeutic     1. New dose: no change    2. Next INR: 2 weeks      Education provided to patient during the visit:  Patient instructed to call in interim with questions, concerns and changes.   Patient educated on interactions between medications and warfarin.   Patient educated on dietary consistency in vitamin k consumption.   Patient educated on affects of alcohol consumption while taking warfarin.   Patient educated on signs of bleeding/clotting.   Patient educated on compliance with dosing, follow up appointments, and prescribed plan of care.

## 2025-06-30 NOTE — PROGRESS NOTES
Physical Therapy      Patient Name: Savita Buchanan  MRN: [id number]  Today's Date: 7/1/2025  Time Calculation  Start Time: 1113  Stop Time: 1204  Time Calculation (min): 51 min: 44 min billable time    Current Problem  Problem List Items Addressed This Visit           ICD-10-CM    Strain of lumbar region - Primary S39.012A    Spinal stenosis of lumbar region without neurogenic claudication M48.061       Insurance - reviewed:  Visit number: 4 (updated 07/01/25)  Insurance Type: Payor: MEDICARE / Plan: MEDICARE PART A AND B / Product Type: *No Product type* /   Authorization or Plan of Care date Range: 6/12/25 - 9/10/25   Surgery: Ablation A-fib Cryo Cpt 48602, 1/23/2024.  Days since surgery: 525   Referred by: James Franklin DO     Precautions: Fall Risk: None   ALLERGIC TO MASSAGE CREAM /lanolin; used pump hand lotion on Lilia's desk w/o lanolin: no skin reaction noted.    osteopenia lumbar spine;   grade 1 retrolisthesis of L2 on L3  + HTN, medically managed; + blood thinner medication; +latex allergy; + afib  Previous surgeries include: back surgery 2004, heart ablation in 2024       Subjective    Savita Buchanan denies any falls or complications since last session. Savita Buchanan reports pain continues despite performing hep.  Ambulation, sitting, driving, household tasks severely limited by pain.   Has to sleep on right side, or in recliner for portion of the night due to pain, cannot tolerate laying on left.    Sxs were moderately worsened by repetitive movement testing of trunk last visit. Pt did not perform repetitive R thoracic rotation, was unaware she was supposed to perform at home.     Pain:  6/10  Pain location:   mid to lower R thoracic spine, especially T9-T11 region  HEP:  fair/poor compliance due to being busy/pain      Objective     Treatments:  Assigned HEP- Medbridge [id number]    Therapeutic Exercise:    minutes  NC  Attempted the NuStep for warm-up but after 1 min but pt reported an  "increase in low back pain  1x10 seated pelvic tilts (unable to complete in hooklying due to discomfort in this position)  2x10 each LE seated marches + pelvic tilt  2x10 seated dead bugs (added to HEP)  3x10 seated hip abduction against green TB (added to HEP)  2x10 seated hip adduction against pillow + pelvic tilt (added to HEP)  Testing exercises:  SGR, SBL, RFIS, MARCIA, RFISit, Rextsit;  R s/l w/ left thoracic rotation (worse);  L s/l w/ right thoracic rotation (Better)  Seated thoracic right rotation x 10    Manual Therapy:  44 minutes   Pt seated laying over pillows: myofascial cupping and STM to R mid to lower thoracic paraspinals (pt is allergic to an ingredient in massage cream. Used pump hand lotion w/o lanolin on [employer]ilia's desk w/o incident). Myofascial cupping reported same region    Performed by Chelsie Meng, PT:  Trial of DN x 7 min (unbilled)  Patient signed consent form and is aware of risks, benefits and alt treatments.  1\" x 5 reps to mid thoracic spine w/o incident or pain noted by patient.    Skin intact following treatment.      Outpatient Education: DN consent form signed/reviewed   Savita Buchanan verbalizes understanding of all education provided: Yes    Assessment:  Savita Buchanan demonstrates ongoing severe R sided thoracic pain SEVERELY limiting ambulation, prolonged sit/driving, sleeping, ADLS and household activities. In clinic today, pt demo several episodes of pain/spasms despite sitting stationary, with frequent position changes required. No change in sxs to date.  Good relief with manual therapy to address MOD soft tissue dysfunction R thoracic region reported.  Per PT POC, also performed trial of DN for pain relief today. Will monitor response in subsequent sessions.        Savita's  response to tx was: Reduced pain post treatment. Savita's Progress towards goals: Patient reports there has not been a significant change in functional abilities. Savita Buchanan continues to have " decreased strength, decreased ROM, and pain limiting participation in household chores, recreational activities, exercise, sleep, attending medical appointments, and shopping within the community Further skilled therapy services are warranted to address the remaining impairments in order to progress towards functional goals. Savita left session with all questions answered and no increase in symptoms.      Plan:   Monitor response to DN  Progress there ex as able      Time Calculation  Start Time: 1113  Stop Time: 1204  Time Calculation (min): 51 min: 44 min billable time     PT Therapeutic Procedures Time Entry  Manual Therapy Time Entry: 44

## 2025-07-01 ENCOUNTER — TREATMENT (OUTPATIENT)
Dept: PHYSICAL THERAPY | Facility: CLINIC | Age: 70
End: 2025-07-01
Payer: MEDICARE

## 2025-07-01 DIAGNOSIS — M48.061 SPINAL STENOSIS OF LUMBAR REGION WITHOUT NEUROGENIC CLAUDICATION: ICD-10-CM

## 2025-07-01 DIAGNOSIS — S39.012D STRAIN OF LUMBAR REGION, SUBSEQUENT ENCOUNTER: Primary | ICD-10-CM

## 2025-07-01 PROCEDURE — 97140 MANUAL THERAPY 1/> REGIONS: CPT | Mod: GP,CQ

## 2025-07-02 ENCOUNTER — OFFICE VISIT (OUTPATIENT)
Dept: CARDIOLOGY | Facility: CLINIC | Age: 70
End: 2025-07-02
Payer: MEDICARE

## 2025-07-02 VITALS
SYSTOLIC BLOOD PRESSURE: 132 MMHG | DIASTOLIC BLOOD PRESSURE: 80 MMHG | OXYGEN SATURATION: 98 % | WEIGHT: 178 LBS | HEART RATE: 73 BPM | BODY MASS INDEX: 28.73 KG/M2

## 2025-07-02 DIAGNOSIS — G47.33 OBSTRUCTIVE SLEEP APNEA: ICD-10-CM

## 2025-07-02 DIAGNOSIS — I34.0 MITRAL VALVE INSUFFICIENCY, UNSPECIFIED ETIOLOGY: ICD-10-CM

## 2025-07-02 DIAGNOSIS — I25.10 CORONARY ARTERY CALCIFICATION: Primary | ICD-10-CM

## 2025-07-02 DIAGNOSIS — I10 HYPERTENSION, BENIGN: ICD-10-CM

## 2025-07-02 DIAGNOSIS — I48.0 PAROXYSMAL ATRIAL FIBRILLATION (MULTI): ICD-10-CM

## 2025-07-02 DIAGNOSIS — Z72.0 TOBACCO ABUSE: ICD-10-CM

## 2025-07-02 DIAGNOSIS — E78.5 HYPERLIPIDEMIA, UNSPECIFIED HYPERLIPIDEMIA TYPE: ICD-10-CM

## 2025-07-02 PROBLEM — J01.90 ACUTE BACTERIAL SINUSITIS: Status: RESOLVED | Noted: 2023-03-24 | Resolved: 2025-07-02

## 2025-07-02 PROBLEM — B96.89 ACUTE BACTERIAL SINUSITIS: Status: RESOLVED | Noted: 2023-03-24 | Resolved: 2025-07-02

## 2025-07-02 PROCEDURE — 99214 OFFICE O/P EST MOD 30 MIN: CPT | Performed by: INTERNAL MEDICINE

## 2025-07-02 PROCEDURE — 1159F MED LIST DOCD IN RCRD: CPT | Performed by: INTERNAL MEDICINE

## 2025-07-02 PROCEDURE — G2211 COMPLEX E/M VISIT ADD ON: HCPCS | Performed by: INTERNAL MEDICINE

## 2025-07-02 PROCEDURE — 3079F DIAST BP 80-89 MM HG: CPT | Performed by: INTERNAL MEDICINE

## 2025-07-02 PROCEDURE — 99212 OFFICE O/P EST SF 10 MIN: CPT

## 2025-07-02 PROCEDURE — 3074F SYST BP LT 130 MM HG: CPT | Performed by: INTERNAL MEDICINE

## 2025-07-02 NOTE — PROGRESS NOTES
Miquel Complaint:   6 MONTH FOLLOW UP , Atrial Fibrillation, Hypertension, and Rapid Heart Rate     History Of Present Illness:    Savita Buchanan is a 69 y.o. female presenting with PAFIB.    Patient denies chest pain/SOB/palpitations/dizziness/lightheadedness/edema/claudication  No regular exercise but very active with recent move  No bleeding with warfarin  Still smokes but cutting back       Activity limited by back pain  Last Recorded Vitals:  Vitals:    07/02/25 1028 07/02/25 1041   BP: 128/80 132/80   BP Location: Left arm    Patient Position: Sitting    BP Cuff Size: Adult    Pulse: 73    SpO2: 98%    Weight: 80.7 kg (178 lb)             Allergies:  Penicillins, Latex, Phenobarbital, Sulfa (sulfonamide antibiotics), Adhesive, and Neomycin-bacitracin-polymyxin    Outpatient Medications:  Current Outpatient Medications   Medication Instructions    ascorbic acid (Vitamin C) 500 mg tablet 1 tablet, Daily    baclofen (LIORESAL) 10 mg, oral, 2 times daily PRN    cholecalciferol, vitamin D3, 250 mcg (10,000 unit) capsule 1 capsule, Daily    cyanocobalamin (Vitamin B-12) 100 mcg tablet 1 tablet, Daily    flecainide (TAMBOCOR) 100 mg, oral, 2 times daily    gabapentin (NEURONTIN) 300 mg, oral, 2 times daily    Lactobacillus acidophilus (PROBIOTIC ORAL) 1 tablet, Daily    lisinopriL-hydrochlorothiazide 20-12.5 mg tablet 1 tablet, oral, Daily    lisinopril 20 mg, oral, Daily    magnesium oxide 500 mg tablet 1 tablet, Daily    melatonin 5 mg, Nightly    metoprolol succinate XL (TOPROL-XL) 50 mg, oral, Daily, Do not crush or chew.    multivitamin tablet 1 tablet, Daily    potassium chloride CR 20 mEq ER tablet 20 mEq, oral, Every 12 hours    predniSONE (Deltasone) 10 mg tablet 1 tablet 3 times daily for 5 days then 1 tablet twice daily for 5 days then 1 tablet daily till finished    simvastatin (ZOCOR) 10 mg, oral, Daily    tiZANidine (ZANAFLEX) 4 mg, oral, 2 times daily RT    warfarin (Jantoven) 4 mg tablet Take 4 mg  (1 tablet) every Friday and 2 mg (1/2 tablet) all other days or as directed by the anticoagulation clinic.    zinc sulfate (Zincate) 220 (50 Zn) MG capsule 1 capsule, Daily       Physical Exam:  Constitutional:       General: Awake.      Appearance: Not in distress. Obese.   Neck:      Vascular: No JVR. JVD normal.   Pulmonary:      Effort: Pulmonary effort is normal.      Breath sounds: Normal breath sounds. No wheezing. No rhonchi. No rales.   Chest:      Chest wall: Not tender to palpatation.   Cardiovascular:      PMI at left midclavicular line. Normal rate. Regular rhythm. Normal S1. Normal S2.       Murmurs: There is no murmur.      No gallop.  No click. No rub.   Pulses:     Intact distal pulses.   Edema:     Peripheral edema absent.   Abdominal:      General: Abdomen is protuberant. Bowel sounds are normal.      Palpations: Abdomen is soft.      Tenderness: There is no abdominal tenderness.   Musculoskeletal: Normal range of motion.         General: No tenderness. Skin:     General: Skin is warm and dry.   Neurological:      General: No focal deficit present.      Mental Status: Alert and oriented to person, place and time.          Last Labs:  CBC -  Lab Results   Component Value Date    WBC 11.7 (H) 12/03/2024    HGB 14.9 12/03/2024    HCT 45.9 12/03/2024    MCV 90 12/03/2024     12/03/2024       CMP -  Lab Results   Component Value Date    CALCIUM 10.1 12/03/2024    PROT 7.5 12/03/2024    ALBUMIN 4.4 12/03/2024    AST 24 12/03/2024    ALT 23 12/03/2024    ALKPHOS 86 12/03/2024    BILITOT 0.6 12/03/2024       LIPID PANEL -   Lab Results   Component Value Date    CHOL 146 12/03/2024    TRIG 200 (H) 12/03/2024    HDL 47.5 12/03/2024    CHHDL 3.1 12/03/2024    LDLF 62 10/17/2019    VLDL 40 12/03/2024    NHDL 99 12/03/2024   Ldl=59    RENAL FUNCTION PANEL -   Lab Results   Component Value Date    GLUCOSE 100 (H) 12/03/2024     12/03/2024    K 4.2 12/03/2024     12/03/2024    CO2 28  12/03/2024    ANIONGAP 13 12/03/2024    BUN 13 12/03/2024    CREATININE 0.64 12/03/2024    CALCIUM 10.1 12/03/2024    ALBUMIN 4.4 12/03/2024        Lab Results   Component Value Date     (H) 01/12/2024    HGBA1C 6.1 (H) 12/03/2024           Lab review: I have personally reviewed the laboratory result(s)       Problem List Items Addressed This Visit       Paroxysmal atrial fibrillation (Multi)    Overview   Long standing PAFIB  Had increasing frequency thus had cryoablation 1/2024.  Had additional AFIB-flecainide resumed 4/2025  Now in NSR     On warfarin. Follows in [facility] Clinic.           Coronary artery calcification - Primary    Overview   Per coronary calcium on CT chest.   Patient with no angina and 5/4/2020 stress echo w/decreased exercise tolerance but no ischemia and NL LVEF.   On statin and warfarin.          Hyperlipidemia    Overview   On low-intensity statin  12/2024  MTPLU=502 ,LDL=59,XC=602  Note TG=255-needs weight loss         Hypertension, benign    Overview   BP stable           Obstructive sleep apnea    Overview   On CPAP         Tobacco abuse    Overview   Is cutting back  Discussed stopping completely         Mitral regurgitation    Overview   Moderate-severe per 12/2023 echo  Moderate per 12/2024 echo  NL LVEF  No signs CHF  Follow          Stop smoking  Weight loss    Deep Nur, DO

## 2025-07-07 NOTE — PROGRESS NOTES
"Physical Therapy      Patient Name: Savita Buchanan  MRN: [id number]  Today's Date: 7/8/2025  Time Calculation  Start Time: 1120  Stop Time: 1200  Time Calculation (min): 40 min:     Current Problem  Problem List Items Addressed This Visit           ICD-10-CM    Strain of lumbar region - Primary S39.012A    Spinal stenosis of lumbar region without neurogenic claudication M48.061       Insurance - reviewed:  Visit number: 5 (updated 07/08/25)  Insurance Type: Payor: MEDICARE / Plan: MEDICARE PART A AND B / Product Type: *No Product type* /   Authorization or Plan of Care date Range: 6/12/25 - 9/10/25   Surgery: Ablation A-fib Cryo Cpt 49955, 1/23/2024.  Days since surgery: 532   Referred by: James Franklin,      Precautions: Fall Risk: None   ALLERGIC TO MASSAGE CREAM /lanolin; used pump hand lotion (green label, not pink) on Lilia's desk w/o lanolin: no skin reaction noted.      osteopenia lumbar spine;   grade 1 retrolisthesis of L2 on L3  + HTN, medically managed; + blood thinner medication; +latex allergy; + afib  Previous surgeries include: back surgery 2004, heart ablation in 2024       Subjective    Savita Buchanan denies any falls or complications since last session. Savita Buchanan reports relief for several days after last PT visit: pt unsure if due to manual therapy vs DN.   Pt reports pain can be irregular: driving in passenger side only, prolonged standing, L trunk rotation, or attempt to sleep on L side increases sxs.  \"I was at a 4th of July party and I didn't hurt at all\"      Pain:  5/10  Pain location:   R T10-12 region, radiating into posterior rib region      Objective     Treatments:  Assigned HEP- Medbridge [id number]    Therapeutic Exercise:  3 minutes  NC  Seated thoracic right rotation vs trunk flexion: relief w/ both positions, pt can trial these 10x every other hour and monitor response    NC:  Attempted the NuStep for warm-up but after 1 min but pt reported an increase in low back " pain  1x10 seated pelvic tilts (unable to complete in hooklying due to discomfort in this position)  2x10 each LE seated marches + pelvic tilt  2x10 seated dead bugs (added to HEP)  3x10 seated hip abduction against green TB (added to HEP)  2x10 seated hip adduction against pillow + pelvic tilt (added to HEP)  Testing exercises:  SGR, SBL, RFIS, MARCIA, RFISit, Rextsit;  R s/l w/ left thoracic rotation (worse);  L s/l w/ right thoracic rotation (Better)      Manual Therapy:  37 minutes : use green label hand lotion on [employer]'s desk: pt allergic to lanolin   Pt seated laying over two  pillows: myofascial cupping and STM to R mid to lower thoracic paraspinals, triggerband along T10-12 intercostals, posterior aspect       Outpatient Education: HEP review, rationale for tx interventions. How if PT unsuccessful Thoracic imaging vs chiro vs pain mgmt may be indicated.    Savita Buchanan verbalizes understanding of all education provided: Yes    Assessment:  Savita Buchanan demonstrates ongoing moderate to severe R sided thoracic pain SEVERELY limiting ambulation, prolonged sit/driving, sleeping, ADLS and household activities. Sxs have been present for 10 wks.  Pt has uses steroids, m. Relaxers, and is on gabapentin for pain control.  Pt does report some reduction in pain intensity/frequency since last visit.  Pain reduced to 2/10 post tx.       Savita's  response to tx was: Reduced pain post treatment. Savita's Progress towards goals: Reduced frequency of pain. Reduced intensity of pain. Savita Buchanan continues to have decreased strength, decreased ROM, and pain limiting participation in household chores, recreational activities, exercise, sleep, attending medical appointments, and shopping within the community Further skilled therapy services are warranted to address the remaining impairments in order to progress towards functional goals. Savita left session with all questions answered and no increase in symptoms.       Plan: Monitor carryover of relief w/ manual therapy  Monitor response to trial of frequent trunk flexion vs R rotation every other hour  Pt would like to re-trial DN (with EStIM)  NV  Consider chiro vs pain mgmt if sxs persist      Time Calculation  Start Time: 1120  Stop Time: 1200  Time Calculation (min): 40 min:      PT Therapeutic Procedures Time Entry  Therapeutic Exercise Time Entry: 3  Manual Therapy Time Entry: 37

## 2025-07-08 ENCOUNTER — TREATMENT (OUTPATIENT)
Dept: PHYSICAL THERAPY | Facility: CLINIC | Age: 70
End: 2025-07-08
Payer: MEDICARE

## 2025-07-08 DIAGNOSIS — M48.061 SPINAL STENOSIS OF LUMBAR REGION WITHOUT NEUROGENIC CLAUDICATION: ICD-10-CM

## 2025-07-08 DIAGNOSIS — S39.012D STRAIN OF LUMBAR REGION, SUBSEQUENT ENCOUNTER: ICD-10-CM

## 2025-07-08 DIAGNOSIS — S39.012D STRAIN OF LUMBAR REGION, SUBSEQUENT ENCOUNTER: Primary | ICD-10-CM

## 2025-07-08 PROCEDURE — 97140 MANUAL THERAPY 1/> REGIONS: CPT | Mod: GP,CQ

## 2025-07-08 RX ORDER — TIZANIDINE 4 MG/1
4 TABLET ORAL
Qty: 30 TABLET | Refills: 3 | Status: SHIPPED | OUTPATIENT
Start: 2025-07-08 | End: 2025-07-18

## 2025-07-09 ENCOUNTER — ANTICOAGULATION - WARFARIN VISIT (OUTPATIENT)
Dept: CARDIOLOGY | Facility: CLINIC | Age: 70
End: 2025-07-09
Payer: MEDICARE

## 2025-07-09 DIAGNOSIS — I48.0 PAROXYSMAL ATRIAL FIBRILLATION (MULTI): ICD-10-CM

## 2025-07-09 DIAGNOSIS — Z79.01 LONG TERM (CURRENT) USE OF ANTICOAGULANTS: Primary | ICD-10-CM

## 2025-07-09 LAB
POC INR: 2.1 (ref 0.9–1.1)
POC PROTHROMBIN TIME: ABNORMAL (ref 9.3–12.5)

## 2025-07-09 PROCEDURE — 99211 OFF/OP EST MAY X REQ PHY/QHP: CPT

## 2025-07-09 PROCEDURE — 85610 PROTHROMBIN TIME: CPT | Mod: QW

## 2025-07-09 NOTE — PROGRESS NOTES
Patient identification verified with 2 identifiers.     Location: [facility] - [address] [place], [place] [id number] [phone]      Referring Physician: Deep Nur MD  Enrollment/ Re-enrollment date: December 10, 2025   INR Goal: 2.0-3.0  INR monitoring is per [facility] protocol.  Anticoagulation Medication: warfarin  Indication: Atrial Fibrillation/Atrial Flutter     Patient has been on warfarin since 2018.  Patient transferring to the [facility] from the [facility] as patient has recently relocated to [facility].    Subjective   Bleeding signs/symptoms: No    Bruising: No   Major bleeding event: No  Thrombosis signs/symptoms: No  Thromboembolic event: No  Missed doses: No  Extra doses: No  Medication changes: No  Dietary changes: No  Change in health: No  Change in activity: No  Alcohol: No  Other concerns: No    Upcoming Procedures:  Does the Patient Have any upcoming procedures that require interruption in anticoagulation therapy? no  Does the patient require bridging? no      Anticoagulation Summary  As of 2025      INR goal:  2.0-3.0   TTR:  91.4% (5.9 mo)   INR used for dosin.10 (2025)   Weekly warfarin total:  14 mg               Assessment/Plan   Therapeutic     1. New dose: no change    2. Next INR: 1 month      Education provided to patient during the visit:  Patient instructed to call in interim with questions, concerns and changes.   Patient educated on interactions between medications and warfarin.   Patient educated on dietary consistency in vitamin k consumption.   Patient educated on affects of alcohol consumption while taking warfarin.   Patient educated on signs of bleeding/clotting.   Patient educated on compliance with dosing, follow up appointments, and prescribed plan of care.

## 2025-07-14 ENCOUNTER — APPOINTMENT (OUTPATIENT)
Dept: PHYSICAL THERAPY | Facility: CLINIC | Age: 70
End: 2025-07-14
Payer: MEDICARE

## 2025-07-14 DIAGNOSIS — M48.061 SPINAL STENOSIS OF LUMBAR REGION WITHOUT NEUROGENIC CLAUDICATION: ICD-10-CM

## 2025-07-14 DIAGNOSIS — S39.012D STRAIN OF LUMBAR REGION, SUBSEQUENT ENCOUNTER: Primary | ICD-10-CM

## 2025-07-14 PROCEDURE — 97110 THERAPEUTIC EXERCISES: CPT | Mod: GP | Performed by: PHYSICAL THERAPIST

## 2025-07-14 NOTE — PROGRESS NOTES
Health Maintenance       COVID-19 Vaccine (3 - Pediatric Moderna series)  Overdue since 9/1/2024    Lead Blood/Venous (View Topic Details)  Never done           Following review of the above:  Patient is not proceeding with: COVID-19    Note: Refer to final orders and clinician documentation.         Physical Therapy      Patient Name: Savita Buchanan  MRN: [id number]  Today's Date: 7/14/2025      Current Problem  Problem List Items Addressed This Visit           ICD-10-CM    Strain of lumbar region - Primary S39.012A    Spinal stenosis of lumbar region without neurogenic claudication M48.061       Insurance - reviewed:  Visit number: 6 (updated 07/14/25)  Insurance Type: Payor: MEDICARE / Plan: MEDICARE PART A AND B / Product Type: *No Product type* /   Authorization or Plan of Care date Range: 6/12/25 - 9/10/25   Referred by: James Franklin DO   Evaluating PT:  Cassie Campos PT    Precautions: Fall Risk: None   ALLERGIC TO MASSAGE CREAM /lanolin; used pump hand lotion (green label, not pink) on Lilia's desk w/o lanolin: no skin reaction noted.      osteopenia lumbar spine;   grade 1 retrolisthesis of L2 on L3  + HTN, medically managed; + blood thinner medication; +latex allergy; + afib  Previous surgeries include: back surgery 2004, heart ablation in 2024       Subjective    Savita Buchanan denies any falls or complications since last session.   Savita Buchanan reports her symptoms are varying in intensity and good/bad times w/o understanding any patterns.   Wants IDN trial this date:   patient signed consent and aware of risks by lungs, etc.  Pain:  2/10  Pain location:   R T10-12 region, radiating into posterior rib region  HEP:  fair/poor compliance due to being busy/pain      Objective   R mid anterior thigh sensation impaired - due to back surgery 2004  Right sided convexity noted at T09-T11 region when performing trunk flexion  Palpation T10 rib R is painful.  Intercostals painful.    Treatments:  Assigned HEP- Medbridge [id number]    Therapeutic Exercise:  8 minutes  NC  Seated thoracic right rotation vs trunk flexion: relief w/ both positions, pt can trial these 10x every other hour and monitor response - emphasis on right rotation  1x10 seated pelvic tilts (unable to complete in hooklying due to  "discomfort in this position)  2x10 each LE seated marches + pelvic tilt  2x10 seated dead bugs (added to HEP)  3x10 seated hip abduction against green TB (added to HEP)  2x10 seated hip adduction against pillow + pelvic tilt (added to HEP)  Attempted supine and left s/l exercises:  not tolerated discharged        Manual Therapy:  30 minutes : use green label hand lotion on Lilia's desk: pt allergic to lanolin   Pt seated laying over two  pillows: gentle STM to R mid to lower thoracic paraspinals, triggerband along T10-12 intercostals, posterior aspect   IDN 1/2\" x 8 reps to R T9-11 and 2 reps to L T9, 10 - no adverse events or skin issues noted.        Outpatient Education: HEP review, rationale for tx interventions. How if PT unsuccessful Thoracic imaging vs chiro vs pain mgmt may be indicated.     Savita Buchanan verbalizes understanding of all education provided: Yes    Assessment:  Savita Buchanan notes reduction of pain w/ manual this date  She did not melvin exercise in supine or left s/l today pre manual.  Noted reduction of pain with end range seated thoracic RR but returns upon completion of exercise.  Discussed taping rib cage, put trial sized KT tape on left inner arm to check for any allergies.  Patient to report back results next visit.      Savita's  response to tx was: Reduced pain post treatment. Savita's Progress towards goals: Reduced frequency of pain. Reduced intensity of pain. Savita Buchanan continues to have decreased strength, decreased ROM, and pain limiting participation in household chores, recreational activities, exercise, sleep, attending medical appointments, and shopping within the community Further skilled therapy services are warranted to address the remaining impairments in order to progress towards functional goals. Savita left session with all questions answered and no increase in symptoms.      Plan: Monitor carryover of relief w/ manual therapy  Monitor response to trial of " frequent trunk flexion vs R rotation every other hour  Pt would like to re-trial DN (with EStIM)  NV  Consider chiro vs pain mgmt if sxs persist      Time Calculation  Start Time: 1045  Stop Time: 1123  Time Calculation (min): 38 min:      PT Therapeutic Procedures Time Entry  Therapeutic Exercise Time Entry: 8  Manual Therapy Time Entry: 30

## 2025-07-15 ENCOUNTER — APPOINTMENT (OUTPATIENT)
Dept: PHYSICAL THERAPY | Facility: CLINIC | Age: 70
End: 2025-07-15
Payer: MEDICARE

## 2025-07-15 DIAGNOSIS — S39.012D STRAIN OF LUMBAR REGION, SUBSEQUENT ENCOUNTER: Primary | ICD-10-CM

## 2025-07-15 DIAGNOSIS — M48.061 SPINAL STENOSIS OF LUMBAR REGION WITHOUT NEUROGENIC CLAUDICATION: ICD-10-CM

## 2025-07-21 NOTE — PROGRESS NOTES
Physical Therapy      Patient Name: Savita Buchanan  MRN: [id number]  Today's Date: 7/22/2025      Current Problem  Problem List Items Addressed This Visit           ICD-10-CM    Strain of lumbar region - Primary S39.012A    Spinal stenosis of lumbar region without neurogenic claudication M48.061       Insurance - reviewed:  Visit number: 7 (updated 07/22/25)  Insurance Type: Payor: MEDICARE / Plan: MEDICARE PART A AND B / Product Type: *No Product type* /   Authorization or Plan of Care date Range: 6/12/25 - 9/10/25   Referred by: James Franklin DO   Evaluating PT:  Cassie Campos PT    Precautions: Fall Risk: None   ALLERGIC TO MASSAGE CREAM /lanolin; used pump hand lotion (green label, not pink) on Lilia's desk w/o lanolin: no skin reaction noted.      osteopenia lumbar spine;   grade 1 retrolisthesis of L2 on L3  + HTN, medically managed; + blood thinner medication; +latex allergy; + afib  Previous surgeries include: back surgery 2004, heart ablation in 2024       Subjective    Savita Buchanan denies any falls or complications since last session. Savita Buchanan reports not feeling any better since starting therapy. Reports feeling miserable. Continues to have pain with movement. Had difficulty walking due to increased pain last week but it relaxed in ~2 days. Heat continues to relieve the pain. No longer has an issue getting off the toilet without holding onto something.    Savita Buchanan reports good compliance with HEP.    Pain:  4/10  Pain location: R side      Objective     Treatments:  Assigned HEP- Medbridge [id number]    Therapeutic Activity: 36 minutes  Goal review (see below)  Assessed B hip flexor MMT in sitting L/R: 4- / 4-  Assessed lumbar AROM in standing:  Flexion: no pain  Extension: pain during motion  Side-bending L/R: pain / no pain  Rotation L/R: pain / no pain  Verbally reviewed the following HEP:  Seated Hip Abduction with Resistance  - 1 x daily - 7 x weekly - 3 sets - 10 reps  Seated  Hip Adduction Isometrics with Ball  - 1 x daily - 7 x weekly - 3 sets - 10 reps - 5 sec hold  Seated thoracic rotation to the R      Outpatient Education: Reviewed home exercise program. Answered questions about home exercise program.   Savita Buchanan verbalizes understanding of all education provided: Yes    Reassessment:   Savita Buchanan has completed 7 physical therapy sessions from 6/12/2025 to 7/22/25 to address deficits contributing to mid/low back pain.  Treatment has included Therapeutic exercise, Manual therapy, Home program instruction and progression, Therapeutic activities, Self care and home management, Instruction in activity modification, and Dry Needling.  she reports patient reports compliance with the instructed home exercises..  Savita has not made significant progress towards the established therapy goals.  At this time I recommend Savita follow up with the referring physician. Encouraged pt to trial seeing a chiropractor to have her thoracic spine and ribs more thoroughly evaluated.    Goals:   STG's (within 2 weeks)  Savita Buchanan will decrease pain by >/= 2/10 points from baseline to improve ability to perform household/recreational activities.  7/22/25 GOAL MET - pain is a 7-8/10 at max at night     Savita Buchanan will be able to sleep through the night with less than 2 interruptions due to pain in order to improve mental and physical well-being in order to safely participate in ADLs.   7/22/25 GOAL MET     Savita Buchanan will demonstrate independence,  excellent understanding of and report compliance with at least 3 exercises in her HEP to facilitate the learning process to maximize PT benefits and to facilitate independent rehab program upon discharge.  7/22/25 GOAL MET     LTG's (by discharge)     Savita Buchanan will report 50% reduction in tenderness to palpation to indicate healing and to improve ADLs, functional mobility, and other household and recreational  activities.  7/22/25 GOAL NOT MET - reports tenderness to palpation is the same     Savita Buchanan will improve Oswestry (GERBER) score by at least 12 points (minimally clinically important difference) in order to reflect improvement in ADL's/pain reduction.   Oswestry (GERBER) baseline 6/12/2025: 13/50  (an GERBER score of =20% (</=10/50) represents minimal to no disability)  7/22/25 GOAL NOT MET - 26/50     Savita Buchanan will demonstrate good posture with <2 cues for correction during 45 minute treatment session showing knowledge of appropriate spinal alignment to decrease excess strain through the spine and allow for increased ability to perform ADLs, IADLs, functional mobility, and other household/recreational activities.  7/22/25 GOAL MET     Savita Buchanan will demonstrate appropriate body mechanics with <2 cues for correction during 45 minute treatment session in order to enhance body mechanics and protect the integrity of lumbar spine during ADLs, functional mobility, and household duties.  7/22/25 GOAL MET     Savita Buchanan will demonstrate pain-free ROM in all planes of lumbar spinal motion to allow for work activities and household chores with minimal to no pain, difficulty, or modifications.  7/22/25 GOAL PARTIALLY MET - pain with reported with extension and L rotation/side-bending     Savita Buchanan will be able to perform all aspects of bed mobility independently without pain or increase in time to complete.  7/22/25 GOAL NOT MET - bed mobility still causes increased pain     Savita Buchanan will IND ambulate on even surfaces without distance restriction, increase in pain, major deviation or instability to improve participation in ADLs, functional mobility, and other household and recreational activities.  7/22/25 GOAL NOT MET - pain is present with walking     Savita Buchanan will report ability to IND ambulate in community for > or =1 hours with no pain, compensations, or modifications in order to  improve her capacity for work and completing activities around her home.   7/22/25 GOAL NOT MET - pain is present with walking     Savita Buchanan to increase core/B LE strength in deficient muscles to >/= 4/5 to improve dynamic stability during ADLs, functional mobility, and other household/recreational activities and to assist in pt's ability to complete work tasks and household chores with minimal to no difficulty or modifications.  7/22/25 GOAL NOT MET - B hip flexion MMT 4-/5     Savita Buchanan will decrease pain to 0-2/10 to improve ability to perform household/recreational activities.  7/22/25 GOAL NOT MET - pain is a 7-8/10 at max at night     Savita Buchanan will be able to sleep through the night without waking due to pain in order to improve mental and physical well-being in order to safely participate in ADLs.  7/22/25 GOAL NOT MET - pain wakes her up once a night     Savita Buchanan will demonstrate independence,  excellent understanding of and report compliance with HEP to facilitate the learning process to maximize PT benefits and to facilitate independent rehab program upon discharge.  7/22/25 GOAL MET    Plan: DC from PT and follow up with chiropractor/referring physician to further address symptoms.      Time Calculation  Start Time: 1047  Stop Time: 1123  Time Calculation (min): 36 min     PT Therapeutic Procedures Time Entry  Therapeutic Activity Time Entry: 36

## 2025-07-22 ENCOUNTER — TREATMENT (OUTPATIENT)
Dept: PHYSICAL THERAPY | Facility: CLINIC | Age: 70
End: 2025-07-22
Payer: MEDICARE

## 2025-07-22 DIAGNOSIS — I10 HYPERTENSION, BENIGN: ICD-10-CM

## 2025-07-22 DIAGNOSIS — I48.91 ATRIAL FIBRILLATION, UNSPECIFIED TYPE (MULTI): ICD-10-CM

## 2025-07-22 DIAGNOSIS — S39.012D STRAIN OF LUMBAR REGION, SUBSEQUENT ENCOUNTER: Primary | ICD-10-CM

## 2025-07-22 DIAGNOSIS — M48.061 SPINAL STENOSIS OF LUMBAR REGION WITHOUT NEUROGENIC CLAUDICATION: ICD-10-CM

## 2025-07-22 PROCEDURE — 97530 THERAPEUTIC ACTIVITIES: CPT | Mod: GP

## 2025-07-22 RX ORDER — WARFARIN 4 MG/1
TABLET ORAL
Qty: 55 TABLET | Refills: 0 | Status: SHIPPED | OUTPATIENT
Start: 2025-07-22

## 2025-07-22 RX ORDER — LISINOPRIL AND HYDROCHLOROTHIAZIDE 12.5; 2 MG/1; MG/1
1 TABLET ORAL DAILY
Qty: 90 TABLET | Refills: 0 | Status: SHIPPED | OUTPATIENT
Start: 2025-07-22

## 2025-07-24 ENCOUNTER — OFFICE VISIT (OUTPATIENT)
Dept: PRIMARY CARE | Facility: CLINIC | Age: 70
End: 2025-07-24
Payer: MEDICARE

## 2025-07-24 VITALS
DIASTOLIC BLOOD PRESSURE: 73 MMHG | WEIGHT: 177.4 LBS | OXYGEN SATURATION: 94 % | TEMPERATURE: 97.6 F | HEART RATE: 74 BPM | SYSTOLIC BLOOD PRESSURE: 143 MMHG | HEIGHT: 66 IN | BODY MASS INDEX: 28.51 KG/M2

## 2025-07-24 DIAGNOSIS — D33.4 BENIGN NEOPLASM OF SPINAL CORD (MULTI): ICD-10-CM

## 2025-07-24 DIAGNOSIS — M48.061 SPINAL STENOSIS OF LUMBAR REGION WITHOUT NEUROGENIC CLAUDICATION: Primary | ICD-10-CM

## 2025-07-24 DIAGNOSIS — R16.0 HEPATOMEGALY: ICD-10-CM

## 2025-07-24 DIAGNOSIS — M51.26 DISPLACEMENT OF LUMBAR INTERVERTEBRAL DISC WITHOUT MYELOPATHY: ICD-10-CM

## 2025-07-24 DIAGNOSIS — S39.012D STRAIN OF LUMBAR REGION, SUBSEQUENT ENCOUNTER: ICD-10-CM

## 2025-07-24 PROCEDURE — 3078F DIAST BP <80 MM HG: CPT | Performed by: FAMILY MEDICINE

## 2025-07-24 PROCEDURE — 1160F RVW MEDS BY RX/DR IN RCRD: CPT | Performed by: FAMILY MEDICINE

## 2025-07-24 PROCEDURE — 3008F BODY MASS INDEX DOCD: CPT | Performed by: FAMILY MEDICINE

## 2025-07-24 PROCEDURE — 1159F MED LIST DOCD IN RCRD: CPT | Performed by: FAMILY MEDICINE

## 2025-07-24 PROCEDURE — 99213 OFFICE O/P EST LOW 20 MIN: CPT | Performed by: FAMILY MEDICINE

## 2025-07-24 PROCEDURE — G2211 COMPLEX E/M VISIT ADD ON: HCPCS | Performed by: FAMILY MEDICINE

## 2025-07-24 PROCEDURE — 3077F SYST BP >= 140 MM HG: CPT | Performed by: FAMILY MEDICINE

## 2025-07-24 PROCEDURE — 1125F AMNT PAIN NOTED PAIN PRSNT: CPT | Performed by: FAMILY MEDICINE

## 2025-07-24 ASSESSMENT — PATIENT HEALTH QUESTIONNAIRE - PHQ9
SUM OF ALL RESPONSES TO PHQ9 QUESTIONS 1 AND 2: 0
2. FEELING DOWN, DEPRESSED OR HOPELESS: NOT AT ALL
1. LITTLE INTEREST OR PLEASURE IN DOING THINGS: NOT AT ALL
SUM OF ALL RESPONSES TO PHQ9 QUESTIONS 1 AND 2: 0
1. LITTLE INTEREST OR PLEASURE IN DOING THINGS: NOT AT ALL
2. FEELING DOWN, DEPRESSED OR HOPELESS: NOT AT ALL

## 2025-07-24 ASSESSMENT — ENCOUNTER SYMPTOMS
OCCASIONAL FEELINGS OF UNSTEADINESS: 0
LOSS OF SENSATION IN FEET: 0
DEPRESSION: 0
MYALGIAS: 1
BACK PAIN: 1
CONSTITUTIONAL NEGATIVE: 1

## 2025-07-24 ASSESSMENT — PAIN SCALES - GENERAL: PAINLEVEL_OUTOF10: 4

## 2025-07-24 NOTE — PROGRESS NOTES
"Subjective   Patient ID: Savita Buchanan is a 69 y.o. female who presents for misc (Discuss back issues).    HPI     Review of Systems   Constitutional: Negative.    Musculoskeletal:  Positive for back pain and myalgias.       Objective   /73 (BP Location: Right arm)   Pulse 74   Temp 36.4 °C (97.6 °F) (Oral)   Ht 1.676 m (5' 6\")   Wt 80.5 kg (177 lb 6.4 oz)   LMP  (LMP Unknown)   SpO2 94%   BMI 28.63 kg/m²     Physical Exam  Vitals and nursing note reviewed.   Constitutional:       Appearance: Normal appearance.     Cardiovascular:      Rate and Rhythm: Regular rhythm.      Heart sounds: Normal heart sounds.   Pulmonary:      Breath sounds: Normal breath sounds.   Abdominal:      Palpations: Abdomen is soft.     Neurological:      General: No focal deficit present.      Mental Status: She is alert and oriented to person, place, and time.     Psychiatric:         Mood and Affect: Mood normal.         Behavior: Behavior normal.         Assessment/Plan patient seen here with persistent lower back pain despite physical therapy and medications.  We are sending her for an MRI scan of her lumbar spine.  We are also getting an ultrasound of her biliary system with concern about chronic back pain.  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

## 2025-07-29 ENCOUNTER — ANTICOAGULATION - WARFARIN VISIT (OUTPATIENT)
Dept: CARDIOLOGY | Facility: CLINIC | Age: 70
End: 2025-07-29
Payer: MEDICARE

## 2025-07-29 DIAGNOSIS — Z79.01 LONG TERM (CURRENT) USE OF ANTICOAGULANTS: Primary | ICD-10-CM

## 2025-07-29 DIAGNOSIS — I48.0 PAROXYSMAL ATRIAL FIBRILLATION (MULTI): ICD-10-CM

## 2025-08-06 ENCOUNTER — HOSPITAL ENCOUNTER (OUTPATIENT)
Dept: RADIOLOGY | Facility: HOSPITAL | Age: 70
Discharge: HOME | End: 2025-08-06
Payer: MEDICARE

## 2025-08-06 ENCOUNTER — APPOINTMENT (OUTPATIENT)
Dept: CARDIOLOGY | Facility: CLINIC | Age: 70
End: 2025-08-06
Payer: MEDICARE

## 2025-08-06 DIAGNOSIS — R16.0 HEPATOMEGALY: ICD-10-CM

## 2025-08-06 DIAGNOSIS — S39.012D STRAIN OF LUMBAR REGION, SUBSEQUENT ENCOUNTER: ICD-10-CM

## 2025-08-06 DIAGNOSIS — M48.061 SPINAL STENOSIS OF LUMBAR REGION WITHOUT NEUROGENIC CLAUDICATION: ICD-10-CM

## 2025-08-06 PROCEDURE — 76705 ECHO EXAM OF ABDOMEN: CPT | Performed by: RADIOLOGY

## 2025-08-06 PROCEDURE — 76705 ECHO EXAM OF ABDOMEN: CPT

## 2025-08-06 PROCEDURE — 72148 MRI LUMBAR SPINE W/O DYE: CPT

## 2025-08-08 ENCOUNTER — ANTICOAGULATION - WARFARIN VISIT (OUTPATIENT)
Dept: CARDIOLOGY | Facility: CLINIC | Age: 70
End: 2025-08-08
Payer: MEDICARE

## 2025-08-08 DIAGNOSIS — Z79.01 LONG TERM (CURRENT) USE OF ANTICOAGULANTS: Primary | ICD-10-CM

## 2025-08-08 DIAGNOSIS — I48.0 PAROXYSMAL ATRIAL FIBRILLATION (MULTI): ICD-10-CM

## 2025-08-08 LAB
POC INR: 1.9 (ref 0.9–1.1)
POC PROTHROMBIN TIME: ABNORMAL (ref 9.3–12.5)

## 2025-08-08 PROCEDURE — 85610 PROTHROMBIN TIME: CPT | Mod: QW

## 2025-08-08 PROCEDURE — 99211 OFF/OP EST MAY X REQ PHY/QHP: CPT

## 2025-08-08 NOTE — PROGRESS NOTES
Patient identification verified with 2 identifiers.     Location: [facility] - [address] [place], [place] [id number] [phone]      Referring Physician: Deep Nur MD  Enrollment/ Re-enrollment date: December 10, 2025   INR Goal: 2.0-3.0  INR monitoring is per [facility] protocol.  Anticoagulation Medication: warfarin  Indication: Atrial Fibrillation/Atrial Flutter     Patient has been on warfarin since 2018.  Patient transferring to the [facility] from the [facility] as patient has recently relocated to [facility].    Subjective   Bleeding signs/symptoms: No    Bruising: No   Major bleeding event: No  Thrombosis signs/symptoms: No  Thromboembolic event: No  Missed doses: No  Extra doses: No  Medication changes: Yes  increased Tylenol use  Dietary changes: Yes  decreased appetite  Change in health: No  Change in activity: No  Alcohol: No  Other concerns: No    Upcoming Procedures:  Does the Patient Have any upcoming procedures that require interruption in anticoagulation therapy? no  Does the patient require bridging? no      Anticoagulation Summary  As of 2025      INR goal:  2.0-3.0   TTR:  85.4% (6.9 mo)   INR used for dosin.90 (2025)   Weekly warfarin total:  14 mg               Assessment/Plan   Sub therapeutic. Patient diet was inconsistent .  Will maintain dose  RTC in 1 week    Education provided to patient during the visit:  Patient instructed to call in interim with questions, concerns and changes.   Patient educated on interactions between medications and warfarin.   Patient educated on dietary consistency in vitamin k consumption.   Patient educated on affects of alcohol consumption while taking warfarin.   Patient educated on signs of bleeding/clotting.   Patient educated on compliance with dosing, follow up appointments, and prescribed plan of care.

## 2025-08-15 ENCOUNTER — ANTICOAGULATION - WARFARIN VISIT (OUTPATIENT)
Dept: CARDIOLOGY | Facility: CLINIC | Age: 70
End: 2025-08-15
Payer: MEDICARE

## 2025-08-15 DIAGNOSIS — I48.0 PAROXYSMAL ATRIAL FIBRILLATION (MULTI): ICD-10-CM

## 2025-08-15 DIAGNOSIS — Z79.01 LONG TERM (CURRENT) USE OF ANTICOAGULANTS: Primary | ICD-10-CM

## 2025-08-15 LAB
INR IN PPP BY COAGULATION ASSAY EXTERNAL: 2.4
PROTHROMBIN TIME (PT) IN PPP BY COAGULATION ASSAY EXTERNAL: NORMAL

## 2025-08-15 PROCEDURE — 99211 OFF/OP EST MAY X REQ PHY/QHP: CPT

## 2025-08-22 ENCOUNTER — ANTICOAGULATION - WARFARIN VISIT (OUTPATIENT)
Dept: CARDIOLOGY | Facility: CLINIC | Age: 70
End: 2025-08-22
Payer: MEDICARE

## 2025-08-22 DIAGNOSIS — I48.0 PAROXYSMAL ATRIAL FIBRILLATION (MULTI): ICD-10-CM

## 2025-08-22 DIAGNOSIS — Z79.01 LONG TERM (CURRENT) USE OF ANTICOAGULANTS: Primary | ICD-10-CM

## 2025-08-22 LAB
POC INR: 1.6 (ref 0.9–1.1)
POC PROTHROMBIN TIME: ABNORMAL (ref 9.3–12.5)

## 2025-08-22 PROCEDURE — 99211 OFF/OP EST MAY X REQ PHY/QHP: CPT

## 2025-08-22 PROCEDURE — 85610 PROTHROMBIN TIME: CPT | Mod: QW

## 2025-08-26 DIAGNOSIS — I10 HYPERTENSION, BENIGN: ICD-10-CM

## 2025-08-27 ENCOUNTER — APPOINTMENT (OUTPATIENT)
Dept: CARDIOLOGY | Facility: CLINIC | Age: 70
End: 2025-08-27
Payer: MEDICARE

## 2025-08-29 ENCOUNTER — ANTICOAGULATION - WARFARIN VISIT (OUTPATIENT)
Dept: CARDIOLOGY | Facility: CLINIC | Age: 70
End: 2025-08-29
Payer: MEDICARE

## 2025-08-29 DIAGNOSIS — Z79.01 LONG TERM (CURRENT) USE OF ANTICOAGULANTS: Primary | ICD-10-CM

## 2025-08-29 DIAGNOSIS — I48.0 PAROXYSMAL ATRIAL FIBRILLATION (MULTI): ICD-10-CM

## 2025-08-29 LAB
POC INR: 2.7 (ref 0.9–1.1)
POC PROTHROMBIN TIME: ABNORMAL (ref 9.3–12.5)

## 2025-08-29 PROCEDURE — 99211 OFF/OP EST MAY X REQ PHY/QHP: CPT

## 2025-08-29 PROCEDURE — 85610 PROTHROMBIN TIME: CPT | Mod: QW

## 2025-09-02 RX ORDER — LISINOPRIL 20 MG/1
20 TABLET ORAL
Qty: 90 TABLET | Refills: 2 | Status: SHIPPED | OUTPATIENT
Start: 2025-09-02

## 2025-09-05 ENCOUNTER — ANTICOAGULATION - WARFARIN VISIT (OUTPATIENT)
Dept: CARDIOLOGY | Facility: CLINIC | Age: 70
End: 2025-09-05
Payer: MEDICARE

## 2025-09-05 DIAGNOSIS — I48.0 PAROXYSMAL ATRIAL FIBRILLATION (MULTI): ICD-10-CM

## 2025-09-05 DIAGNOSIS — Z79.01 LONG TERM (CURRENT) USE OF ANTICOAGULANTS: Primary | ICD-10-CM

## 2025-09-05 LAB
POC INR: 2.8 (ref 0.9–1.1)
POC PROTHROMBIN TIME: ABNORMAL (ref 9.3–12.5)

## 2025-09-05 PROCEDURE — 99211 OFF/OP EST MAY X REQ PHY/QHP: CPT

## 2025-09-05 PROCEDURE — 85610 PROTHROMBIN TIME: CPT | Mod: QW

## 2026-02-20 ENCOUNTER — APPOINTMENT (OUTPATIENT)
Dept: PRIMARY CARE | Facility: CLINIC | Age: 71
End: 2026-02-20
Payer: MEDICARE

## (undated) DEVICE — CATHETER, ADVISOR HD GRID MAPPING, SENSOR ENABLED

## (undated) DEVICE — CATHETER, ACHIEVE MAPPING, 20MM

## (undated) DEVICE — CABLE, COAXIAL, UMBILICAL

## (undated) DEVICE — CATHETER, ARCTIC FRONT ADVANCE PRO, 28MM

## (undated) DEVICE — CATHETER, VIEW FLEX XTRA ICE, 9FR (REPROCESSED)

## (undated) DEVICE — CATHETER, ACQCROSS, QX FLEXCATH, 65CM

## (undated) DEVICE — CATHETER KIT, RADIAL ARTLINE, 20G X 1 3/4

## (undated) DEVICE — STEERABLE SHEATH, FLEXCATH, 12FR

## (undated) DEVICE — CATHETER, ELECTRODE, STEERABLE, EP-XT, LG CURVE, 6FX125CM, REPROCESSED

## (undated) DEVICE — GUIDEWIRE, AMPLATZ, TFE, EXTRA STIFF, CURVED, .032/145CM/3MMTIP

## (undated) DEVICE — 15.1CM, 60.1CM WIRE, EDGE SYSTEM ELECTRODE W/ QUIK-COMBO CONNECTOR, RECTANGULAR

## (undated) DEVICE — GUIDEWIRE, TORAY, .025 DIA, 230CM

## (undated) DEVICE — ELECTRODE KIT, ENSITE X EP SYSTEM SURFACE

## (undated) DEVICE — INTRODUCER, CATHETER, HEMOSTASIS, FAST-CATH, 12 FR X 12 CM

## (undated) DEVICE — CABLE, ADVISOR HD/VL SE SENSOR, 22 PIN ENSITE X

## (undated) DEVICE — INTRODUCER, CATHETER, HEMOSTASIS, FAST-CATH, 10 FR X 23 CM

## (undated) DEVICE — CLOSURE SYSTEM, VASCULAR, MVP 6-12FR, VENOUS

## (undated) DEVICE — SHEATH, PINNACLE, W/.038 GUIDEWIRE, 10 CM,  6FR INTRODUCER, 6FR DIA, 2.5 CM DIALATOR

## (undated) DEVICE — ACCESS KIT, MINI MAK, 4FR X 10CML, 0.018 X 40CM, SS/SS, ECHO ENHANCED 7CM NDL

## (undated) DEVICE — TUBING, CONTRAST INJECTION, 500PSI, 10IN, 25CM